# Patient Record
Sex: MALE | Race: WHITE | NOT HISPANIC OR LATINO | Employment: UNEMPLOYED | URBAN - NONMETROPOLITAN AREA
[De-identification: names, ages, dates, MRNs, and addresses within clinical notes are randomized per-mention and may not be internally consistent; named-entity substitution may affect disease eponyms.]

---

## 2024-10-21 ENCOUNTER — LAB (OUTPATIENT)
Dept: LAB | Facility: HOSPITAL | Age: 49
End: 2024-10-21
Payer: MEDICAID

## 2024-10-21 ENCOUNTER — OFFICE VISIT (OUTPATIENT)
Dept: PSYCHIATRY | Facility: CLINIC | Age: 49
End: 2024-10-21
Payer: MEDICAID

## 2024-10-21 VITALS
OXYGEN SATURATION: 97 % | WEIGHT: 163 LBS | DIASTOLIC BLOOD PRESSURE: 74 MMHG | HEIGHT: 70 IN | BODY MASS INDEX: 23.34 KG/M2 | HEART RATE: 86 BPM | SYSTOLIC BLOOD PRESSURE: 102 MMHG

## 2024-10-21 DIAGNOSIS — F43.10 POST TRAUMATIC STRESS DISORDER (PTSD): ICD-10-CM

## 2024-10-21 DIAGNOSIS — F11.20 OPIOID USE DISORDER, SEVERE, ON MAINTENANCE THERAPY: Primary | ICD-10-CM

## 2024-10-21 DIAGNOSIS — F33.1 MODERATE EPISODE OF RECURRENT MAJOR DEPRESSIVE DISORDER: ICD-10-CM

## 2024-10-21 DIAGNOSIS — F51.04 INSOMNIA, PSYCHOPHYSIOLOGICAL: ICD-10-CM

## 2024-10-21 DIAGNOSIS — F41.1 GENERALIZED ANXIETY DISORDER: ICD-10-CM

## 2024-10-21 DIAGNOSIS — F15.21 METHAMPHETAMINE USE DISORDER, SEVERE, IN EARLY REMISSION: ICD-10-CM

## 2024-10-21 DIAGNOSIS — F11.20 OPIOID USE DISORDER, SEVERE, ON MAINTENANCE THERAPY: ICD-10-CM

## 2024-10-21 LAB
AMPHET+METHAMPHET UR QL: NEGATIVE
AMPHETAMINES UR QL: NEGATIVE
BARBITURATES UR QL SCN: NEGATIVE
BENZODIAZ UR QL SCN: NEGATIVE
BUPRENORPHINE SERPL-MCNC: POSITIVE NG/ML
CANNABINOIDS SERPL QL: NEGATIVE
COCAINE UR QL: NEGATIVE
METHADONE UR QL SCN: NEGATIVE
OPIATES UR QL: NEGATIVE
OXYCODONE UR QL SCN: NEGATIVE
PCP UR QL SCN: NEGATIVE
TRICYCLICS UR QL SCN: NEGATIVE

## 2024-10-21 PROCEDURE — 80306 DRUG TEST PRSMV INSTRMNT: CPT

## 2024-10-21 PROCEDURE — 1160F RVW MEDS BY RX/DR IN RCRD: CPT | Performed by: NURSE PRACTITIONER

## 2024-10-21 PROCEDURE — 90792 PSYCH DIAG EVAL W/MED SRVCS: CPT | Performed by: NURSE PRACTITIONER

## 2024-10-21 PROCEDURE — 1159F MED LIST DOCD IN RCRD: CPT | Performed by: NURSE PRACTITIONER

## 2024-10-21 RX ORDER — TRAZODONE HYDROCHLORIDE 50 MG/1
50 TABLET, FILM COATED ORAL NIGHTLY PRN
Qty: 30 TABLET | Refills: 0 | Status: SHIPPED | OUTPATIENT
Start: 2024-10-21

## 2024-10-21 RX ORDER — BUPRENORPHINE HYDROCHLORIDE AND NALOXONE HYDROCHLORIDE DIHYDRATE 8; 2 MG/1; MG/1
2 TABLET SUBLINGUAL DAILY
Qty: 16 TABLET | Refills: 0 | Status: SHIPPED | OUTPATIENT
Start: 2024-10-21 | End: 2024-10-28 | Stop reason: SDUPTHER

## 2024-10-21 RX ORDER — BUPRENORPHINE HYDROCHLORIDE AND NALOXONE HYDROCHLORIDE DIHYDRATE 8; 2 MG/1; MG/1
1 TABLET SUBLINGUAL DAILY
COMMUNITY
End: 2024-10-21 | Stop reason: SDUPTHER
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
## 2024-10-21 NOTE — PATIENT INSTRUCTIONS
Major Depressive Disorder, Adult  Major depressive disorder (MDD) is a mental health condition. It may also be called clinical depression or unipolar depression. MDD causes symptoms of sadness, hopelessness, and loss of interest in things. These symptoms last most of the day, almost every day, for 2 weeks. MDD can also cause physical symptoms. It can interfere with relationships and activities, such as work, school, and activities that are usually pleasant.  MDD may be mild, moderate, or severe. It may be single-episode MDD, which happens once, or recurrent MDD, which may occur many times.  What are the causes?  The exact cause of this condition is not known.  What increases the risk?  The following factors may make someone more likely to develop MDD:  A family history of depression.  Being female.  Long-term (chronic) stress, physical illness, other mental health disorders, or substance misuse.  Trauma, including:  Family problems.  Violence or abuse.  Loss of a parent or close family member.  Experiencing discrimination.  What are the signs or symptoms?  The main symptoms of MDD usually include:  Constant depressed or irritable mood.  A loss of interest in activities.  Sleeping or eating too much or too little.  Tiredness or low energy.  Other symptoms include:  Unexplained weight gain or weight loss.  Being agitated, restless, or weak.  Feeling hopeless, worthless, or guilty.  Trouble thinking clearly or making decisions.  Thoughts of suicide or harming others.  Spending a lot of time alone.  Not being able to complete daily tasks or work.  Severe symptoms of this condition may include:  Psychotic depression.This may include false beliefs or delusions. It may also include seeing, hearing, tasting, smelling, or feeling things that are not real (hallucinations).  Chronic depression or persistent depressive disorder. This is low-level depression that lasts for at least 2 years.  Melancholic depression, or feeling  extremely sad and hopeless.  Catatonic depression, which includes trouble speaking and trouble moving.  Seasonal depression, which is caused by changes in the seasons.  How is this diagnosed?  This condition may be diagnosed based on:  Your symptoms.  Your medical and mental health history.  A physical exam.  Blood tests to rule out other conditions.  MDD is confirmed if you have either a depressed mood or loss of interest and at least four other MDD symptoms, most of the day, nearly every day, in a 2-week period.  How is this treated?  This condition is usually treated by mental health professionals, such as psychologists, psychiatrists, and clinical social workers. You may need more than one type of treatment. Treatment may include:  Psychotherapy, also called talk therapy or counseling. Types of psychotherapy include:  Cognitive behavioral therapy (CBT). This teaches you to recognize unhealthy feelings, thoughts, and behaviors, and replace them with positive thoughts and actions.  Interpersonal therapy (IPT). This helps you to improve the way you communicate with others or relate to them.  Family therapy. This treatment includes members of your family.  Medicines to treat anxiety and depression. These medicines help to balance the brain chemicals that affect your emotions.  Lifestyle changes. You may be asked to:  Limit alcohol use and avoid drug use.  Get regular exercise.  Get plenty of sleep.  Make healthy eating choices.  Spend more time outdoors.  Brain stimulation. This may be done if symptoms are very severe and other treatments have not worked. Examples of this treatment are electroconvulsive therapy and transcranial magnetic stimulation.  Follow these instructions at home:  Alcohol use  Do not drink alcohol if:  Your health care provider tells you not to drink.  You are pregnant, may be pregnant, or are planning to become pregnant.  If you drink alcohol:  Limit how much you have to:  0-1 drink a day for  women  0-2 drinks a day for men.  Know how much alcohol is in your drink. In the [place] one drink equals one 12 oz bottle of beer (355 mL), one 5 oz glass of wine (148 mL), or one 1½ oz glass of hard liquor (44 mL).  Activity  Exercise regularly and spend time outdoors.  Find activities that you enjoy and make time to do them.  Find healthy ways to manage stress, such as:  Meditation or deep breathing.  Spending time in nature.  Journaling.  Return to your normal activities as told by your health care provider. Ask your health care provider what activities are safe for you.  General instructions    Take over-the-counter and prescription medicines only as told by your health care provider.  Discuss alcohol use with your health care provider. Alcohol can affect any antidepressant medicines you are taking.  Discuss any drug use with your health care provider.  Eat a healthy diet and get enough sleep.  Consider joining a support group. Your health care provider may be able to recommend one.  Keep all follow-up visits. It is important for your health care provider to check on your mood, behavior, and medicines. Your health care provider will make changes to your treatment as needed.  Where to find more information  [place] [employer] [place]ness: marcella.org  [place] of Mental Health: nimh.nih.gov  American Psychiatric Association: psychiatry.org  Contact a health care provider if:  Your symptoms get worse.  You develop new symptoms.  Get help right away if:  You hurt yourself on purpose (self-harm).  You have thoughts about hurting yourself or others.  You have hallucinations.  Get help right away if you feel like you may hurt yourself or others, or have thoughts about taking your own life. Go to your nearest emergency room or:  Call 911.  Call the National Suicide Prevention Lifeline at 1-840.812.8899 or 562. This is open 24 hours a day.  Text the Crisis Text Line at 784280.  This information is not  intended to replace advice given to you by your health care provider. Make sure you discuss any questions you have with your health care provider.  Document Revised: 04/25/2023 Document Reviewed: 04/25/2023  Elsevier Patient Education © 2024 [employer]

## 2024-10-22 RX ORDER — VELPATASVIR AND SOFOSBUVIR 100; 400 MG/1; MG/1
1 TABLET, FILM COATED ORAL DAILY
COMMUNITY

## 2024-10-25 LAB — REF LAB TEST METHOD: NORMAL

## 2024-10-28 ENCOUNTER — LAB (OUTPATIENT)
Dept: LAB | Facility: HOSPITAL | Age: 49
End: 2024-10-28
Payer: MEDICAID

## 2024-10-28 ENCOUNTER — OFFICE VISIT (OUTPATIENT)
Dept: PSYCHIATRY | Facility: CLINIC | Age: 49
End: 2024-10-28
Payer: MEDICAID

## 2024-10-28 VITALS
WEIGHT: 171 LBS | HEIGHT: 70 IN | OXYGEN SATURATION: 96 % | SYSTOLIC BLOOD PRESSURE: 122 MMHG | HEART RATE: 84 BPM | DIASTOLIC BLOOD PRESSURE: 74 MMHG | BODY MASS INDEX: 24.48 KG/M2

## 2024-10-28 DIAGNOSIS — F43.10 POST TRAUMATIC STRESS DISORDER (PTSD): ICD-10-CM

## 2024-10-28 DIAGNOSIS — F51.04 INSOMNIA, PSYCHOPHYSIOLOGICAL: Primary | ICD-10-CM

## 2024-10-28 DIAGNOSIS — F11.20 OPIOID USE DISORDER, SEVERE, ON MAINTENANCE THERAPY: ICD-10-CM

## 2024-10-28 DIAGNOSIS — F33.1 MODERATE EPISODE OF RECURRENT MAJOR DEPRESSIVE DISORDER: ICD-10-CM

## 2024-10-28 DIAGNOSIS — F51.04 INSOMNIA, PSYCHOPHYSIOLOGICAL: ICD-10-CM

## 2024-10-28 DIAGNOSIS — F15.21 METHAMPHETAMINE USE DISORDER, SEVERE, IN EARLY REMISSION: ICD-10-CM

## 2024-10-28 DIAGNOSIS — F41.1 GENERALIZED ANXIETY DISORDER: ICD-10-CM

## 2024-10-28 PROCEDURE — 1160F RVW MEDS BY RX/DR IN RCRD: CPT | Performed by: NURSE PRACTITIONER

## 2024-10-28 PROCEDURE — 1159F MED LIST DOCD IN RCRD: CPT | Performed by: NURSE PRACTITIONER

## 2024-10-28 PROCEDURE — 80306 DRUG TEST PRSMV INSTRMNT: CPT

## 2024-10-28 PROCEDURE — 99214 OFFICE O/P EST MOD 30 MIN: CPT | Performed by: NURSE PRACTITIONER

## 2024-10-28 PROCEDURE — 96127 BRIEF EMOTIONAL/BEHAV ASSMT: CPT | Performed by: NURSE PRACTITIONER

## 2024-10-28 RX ORDER — BUPRENORPHINE HYDROCHLORIDE AND NALOXONE HYDROCHLORIDE DIHYDRATE 8; 2 MG/1; MG/1
2 TABLET SUBLINGUAL DAILY
Qty: 14 TABLET | Refills: 0 | Status: SHIPPED | OUTPATIENT
Start: 2024-10-28 | End: 2024-11-04 | Stop reason: SDUPTHER

## 2024-10-28 NOTE — PROGRESS NOTES
Office  Follow Up Visit      Patient Name: Simon Pate  : 1975   MRN: [id number]     Referring Provider: Provider, No Known    Chief Complaint: Substance use    History of Present Illness:   Simon Pate is a 48 y.o. male who is here today for follow up related to substance use. Taking buprenorphine/naloxone 16-4 mg daily and is tolerating well with no adverse effects. Denies any recurrence of illicit substance or alcohol use, cravings, or triggering events since last follow up. Attending recovery meetings or participating in recovery related content 5 a week and IOP through sober living.  Trazodone 50 mg nightly as needed for sleep started at last visit due to complaint of trouble falling asleep and staying asleep.  Medication has not been helpful.  Still waking up about every 2 hours.  Does not wake feeling rested.  Utilizing good sleep hygiene. LFTs are current as of 2024.  Will anticipate follow-up labs with hep C treatment.     Triggers: Anxiety    Cravings: Every other day for methamphetamine    Relapse Prevention: MOUD, sober living programming    Urine Drug Screen (today's visit) discussed: Will go after visit today    UDS Confirmation: 10/21/2024 positive buprenorphine, norbuprenorphine    CHARISSE (PDMP) Reviewed for Current/Active Medications: buprenorphine/naloxone as expected 10/14/24      Past Surgical History:  Past Surgical History:   Procedure Laterality Date    CYST REMOVAL      MULTIPLE TOOTH EXTRACTIONS         Problem List:  There is no problem list on file for this patient.      Allergy:   Allergies   Allergen Reactions    Ibuprofen Hives and Itching        Current Medications:   Current Outpatient Medications   Medication Sig Dispense Refill    buprenorphine-naloxone (SUBOXONE) 8-2 MG per SL tablet Place 2 tablets under the tongue Daily for 7 days. 14 tablet 0    Sofosbuvir-Velpatasvir (Epclusa) 400-100 MG tablet Take 1 tablet by mouth Daily.      traZODone (DESYREL) 50  MG tablet Take 1 tablet by mouth At Night As Needed for Sleep. 30 tablet 0     No current facility-administered medications for this visit.       Past Medical History:  Past Medical History:   Diagnosis Date    Substance abuse        Social History:  Social History     Socioeconomic History    Marital status: Single   Tobacco Use    Smoking status: Every Day     Current packs/day: 2.00     Average packs/day: 2.0 packs/day for 20.8 years (41.6 ttl pk-yrs)     Types: Cigarettes     Start date: 2004     Passive exposure: Current    Smokeless tobacco: Never   Vaping Use    Vaping status: Every Day    Substances: Nicotine, Flavoring    Devices: Pre-filled or refillable cartridge, Refillable tank    Passive vaping exposure: Yes   Substance and Sexual Activity    Alcohol use: Not Currently    Drug use: Not Currently     Types: Methamphetamines     Comment: sober 5 months    Sexual activity: Defer       Family History:  History reviewed. No pertinent family history.      Subjective      Review of Systems:   Review of Systems   Constitutional:  Positive for fatigue. Negative for chills and fever.   Respiratory:  Negative for shortness of breath.    Cardiovascular:  Negative for chest pain.   Gastrointestinal:  Negative for abdominal pain.   Skin:  Negative for skin lesions.   Neurological:  Negative for seizures and confusion.   Psychiatric/Behavioral:  Positive for decreased concentration, sleep disturbance, depressed mood and stress. Negative for hallucinations and suicidal ideas. The patient is nervous/anxious.        PHQ-9 Depression Screening  Little interest or pleasure in doing things? Several days   Feeling down, depressed, or hopeless? Several days   PHQ-2 Total Score 2   Trouble falling or staying asleep, or sleeping too much? Over half   Feeling tired or having little energy? Several days   Poor appetite or overeating? Not at all   Feeling bad about yourself - or that you are a failure or have let yourself or your  family down? Several days   Trouble concentrating on things, such as reading the newspaper or watching television? Almost all   Moving or speaking so slowly that other people could have noticed? Or the opposite - being so fidgety or restless that you have been moving around a lot more than usual? Over half   Thoughts that you would be better off dead, or of hurting yourself in some way? Not at all   PHQ-9 Total Score 11   If you checked off any problems, how difficult have these problems made it for you to do your work, take care of things at home, or get along with other people? Somewhat difficult     VIRGILIO-7 Score:   Feeling nervous, anxious or on edge: Nearly every day  Not being able to stop or control worrying: More than half the days  Worrying too much about different things: More than half the days  Trouble Relaxing: More than half the days  Being so restless that it is hard to sit still: Nearly every day  Feeling afraid as if something awful might happen: Several days  Becoming easily annoyed or irritable: More than half the days  VIRGILOI 7 Total Score: 15  If you checked any problems, how difficult have these problems made it for you to do your work, take care of things at home, or get along with other people: Somewhat difficult    Patient History:   The following portions of the patient's history were reviewed and updated as appropriate: allergies, current medications, past family history, past medical history, past social history, past surgical history and problem list.     Social:  Social History     Socioeconomic History    Marital status: Single   Tobacco Use    Smoking status: Every Day     Current packs/day: 2.00     Average packs/day: 2.0 packs/day for 20.8 years (41.6 ttl pk-yrs)     Types: Cigarettes     Start date: 2004     Passive exposure: Current    Smokeless tobacco: Never   Vaping Use    Vaping status: Every Day    Substances: Nicotine, Flavoring    Devices: Pre-filled or refillable cartridge,  "Refillable tank    Passive vaping exposure: Yes   Substance and Sexual Activity    Alcohol use: Not Currently    Drug use: Not Currently     Types: Methamphetamines     Comment: sober 5 months    Sexual activity: Defer       Medications:     Current Outpatient Medications:     buprenorphine-naloxone (SUBOXONE) 8-2 MG per SL tablet, Place 2 tablets under the tongue Daily for 7 days., Disp: 14 tablet, Rfl: 0    Sofosbuvir-Velpatasvir (Epclusa) 400-100 MG tablet, Take 1 tablet by mouth Daily., Disp: , Rfl:     traZODone (DESYREL) 50 MG tablet, Take 1 tablet by mouth At Night As Needed for Sleep., Disp: 30 tablet, Rfl: 0    Objective     Physical Exam  Vitals reviewed.   Constitutional:       General: He is not in acute distress.     Appearance: He is well-developed. He is not ill-appearing.   Pulmonary:      Effort: No respiratory distress.   Neurological:      Mental Status: He is alert and oriented to person, place, and time.      Gait: Gait normal.   Psychiatric:         Attention and Perception: Attention normal.         Mood and Affect: Mood and affect normal.         Speech: Speech normal.         Behavior: Behavior normal. Behavior is cooperative.         Thought Content: Thought content is not paranoid or delusional. Thought content does not include homicidal or suicidal ideation. Thought content does not include homicidal or suicidal plan.         Cognition and Memory: Cognition and memory normal.         Vital Signs:   Vitals:    10/28/24 1534   BP: 122/74   Pulse: 84   SpO2: 96%   Weight: 77.6 kg (171 lb)   Height: 177.8 cm (70\")     Body mass index is 24.54 kg/m².     Mental Status Exam:   Hygiene:   good  Cooperation:  Cooperative  Eye Contact:  Good  Psychomotor Behavior:  Appropriate  Affect:  Full range  Mood: normal  Speech:  Normal  Thought Process:  Goal directed  Thought Content:  Normal  Suicidal:  None  Homicidal:  None  Hallucinations:  None  Delusion:  None  Memory:  Intact  Orientation:  " Person, Place, Time, and Situation  Reliability:  good  Insight:  Good  Judgement:  Fair  Impulse Control:  Fair    Assessment / Plan      -Continue buprenorphine/naloxone 16-4 mg daily for opioid use disorder  -Will have RTC dose at next visit +0 remaining  -Advisement to take part in and remain active in 12 Step Recovery Meetings, IOP, and/or 1:1 therapy/counseling and to establish/maintain an active relationship with a recovery sponsor.   -Agreeable to continued monitoring and will request confirmations with levels on all preliminary positive results for patient safety, medication compliance, adherence to treatment plan, toxicity monitoring (when applicable), and planning of future care.   -Continue to follow 15/15/15 minute administration rule  -Safe medication storage encouraged  -Does not need Narcan refill today    -Increase trazodone to 100mg nightly as needed for sleep from home supply.  Hopeful it will help with depression as well.  Declines individual therapy.  Utilizing good sleep hygiene.  Discussed adverse effects of medication and when to call/RTC.  -Has been referred to hep C clinic for continuation of medication.  Will reach out to clinic if he has not heard from them by next appointment.    Assessment & Plan   Problems Addressed this Visit    None  Visit Diagnoses       Insomnia, psychophysiological    -  Primary    Opioid use disorder, severe, on maintenance therapy        Relevant Medications    buprenorphine-naloxone (SUBOXONE) 8-2 MG per SL tablet    Moderate episode of recurrent major depressive disorder              Diagnoses         Codes Comments    Insomnia, psychophysiological    -  Primary ICD-10-CM: F51.04  ICD-9-CM: 307.42     Opioid use disorder, severe, on maintenance therapy     ICD-10-CM: F11.20  ICD-9-CM: 304.00     Moderate episode of recurrent major depressive disorder     ICD-10-CM: F33.1  ICD-9-CM: 296.32               PLAN:  Safety: No acute safety concerns  Risk Assessment:  Risk of self-harm acutely is low. Risk of self-harm chronically is also low, but could be further elevated in the event of treatment noncompliance and/or AODA.      TREATMENT PLAN/GOALS: Continue supportive psychotherapy efforts and medications as indicated. Treatment and medication options discussed during today's visit. Patient acknowledged and verbally consented to continue with current treatment plan and was educated on the importance of compliance with treatment and follow-up appointments.      MEDICATION ISSUES:  CHARISSE reviewed as expected.  Discussed medication options and treatment plan of prescribed medication as well as the risks, benefits, and side effects including potential falls, possible impaired driving and metabolic adversities among others. Patient is agreeable to call the office with any worsening of symptoms or onset of side effects. Patient is agreeable to call 911 or go to the nearest ER should he/she begin having SI/HI. No medication side effects or related complaints today.     MEDS ORDERED DURING VISIT:  New Medications Ordered This Visit   Medications    buprenorphine-naloxone (SUBOXONE) 8-2 MG per SL tablet     Sig: Place 2 tablets under the tongue Daily for 7 days.     Dispense:  14 tablet     Refill:  0     NADEAN:[id number]       Return in about 1 week (around 11/4/2024) for Follow Up Medication.             This document has been electronically signed by KRYSTYNA Strong  October 28, 2024 15:48 EDT      Part of this note may be an electronic transcription/translation of spoken language to printed text using the Dragon Dictation System.

## 2024-10-31 LAB — REF LAB TEST METHOD: NORMAL

## 2024-11-04 ENCOUNTER — LAB (OUTPATIENT)
Dept: LAB | Facility: HOSPITAL | Age: 49
End: 2024-11-04
Payer: MEDICAID

## 2024-11-04 ENCOUNTER — OFFICE VISIT (OUTPATIENT)
Dept: PSYCHIATRY | Facility: CLINIC | Age: 49
End: 2024-11-04
Payer: MEDICAID

## 2024-11-04 VITALS
WEIGHT: 172.8 LBS | DIASTOLIC BLOOD PRESSURE: 74 MMHG | OXYGEN SATURATION: 98 % | HEIGHT: 70 IN | SYSTOLIC BLOOD PRESSURE: 128 MMHG | BODY MASS INDEX: 24.74 KG/M2 | HEART RATE: 76 BPM

## 2024-11-04 DIAGNOSIS — F51.04 INSOMNIA, PSYCHOPHYSIOLOGICAL: ICD-10-CM

## 2024-11-04 DIAGNOSIS — F11.20 OPIOID USE DISORDER, SEVERE, ON MAINTENANCE THERAPY: ICD-10-CM

## 2024-11-04 DIAGNOSIS — F41.1 GENERALIZED ANXIETY DISORDER: ICD-10-CM

## 2024-11-04 DIAGNOSIS — F43.10 POST TRAUMATIC STRESS DISORDER (PTSD): ICD-10-CM

## 2024-11-04 DIAGNOSIS — F33.1 MODERATE EPISODE OF RECURRENT MAJOR DEPRESSIVE DISORDER: ICD-10-CM

## 2024-11-04 DIAGNOSIS — F15.21 METHAMPHETAMINE USE DISORDER, SEVERE, IN EARLY REMISSION: ICD-10-CM

## 2024-11-04 PROCEDURE — 1159F MED LIST DOCD IN RCRD: CPT | Performed by: NURSE PRACTITIONER

## 2024-11-04 PROCEDURE — 80306 DRUG TEST PRSMV INSTRMNT: CPT

## 2024-11-04 PROCEDURE — 1160F RVW MEDS BY RX/DR IN RCRD: CPT | Performed by: NURSE PRACTITIONER

## 2024-11-04 PROCEDURE — 96127 BRIEF EMOTIONAL/BEHAV ASSMT: CPT | Performed by: NURSE PRACTITIONER

## 2024-11-04 PROCEDURE — 99213 OFFICE O/P EST LOW 20 MIN: CPT | Performed by: NURSE PRACTITIONER

## 2024-11-04 RX ORDER — BUPRENORPHINE HYDROCHLORIDE AND NALOXONE HYDROCHLORIDE DIHYDRATE 8; 2 MG/1; MG/1
2 TABLET SUBLINGUAL DAILY
Qty: 14 TABLET | Refills: 0 | Status: SHIPPED | OUTPATIENT
Start: 2024-11-04 | End: 2024-11-11

## 2024-11-04 RX ORDER — TRAZODONE HYDROCHLORIDE 100 MG/1
100 TABLET ORAL NIGHTLY PRN
Qty: 15 TABLET | Refills: 0 | Status: SHIPPED | OUTPATIENT
Start: 2024-11-04 | End: 2024-11-19

## 2024-11-04 NOTE — PROGRESS NOTES
Office  Follow Up Visit      Patient Name: Simon Pate  : 1975   MRN: [id number]     Referring Provider: Provider, No Known    Chief Complaint: Substance use    History of Present Illness:   Simon Pate is a 48 y.o. male who is here today for follow up related to substance use. Taking buprenorphine/naloxone 16-4 mg daily and is tolerating well with no adverse effects. Denies any recurrence of illicit substance or alcohol use, cravings, or triggering events since last follow up. Attending recovery meetings or participating in recovery related content 5 a week and IOP through sober living.  Trazodone increased to 100 mg nightly as needed for sleep started at last visit. Was not able to start as facility needs new rx with instructions.  Still waking up about every 2 hours.  Does not wake feeling rested.  Utilizing good sleep hygiene. LFTs are current as of 2024.  Still needs to get into hep c clinic.      Triggers: Anxiety    Cravings: Denies    Relapse Prevention: MOUD, sober living programming    Urine Drug Screen (today's visit) discussed: Will go after visit today    UDS Confirmation: 10/28/2024 positive buprenorphine, norbuprenorphine (levels stable)    CHARISSE (PDMP) Reviewed for Current/Active Medications: PDMP down    Past Surgical History:  Past Surgical History:   Procedure Laterality Date    CYST REMOVAL      MULTIPLE TOOTH EXTRACTIONS         Problem List:  There is no problem list on file for this patient.      Allergy:   Allergies   Allergen Reactions    Ibuprofen Hives and Itching        Current Medications:   Current Outpatient Medications   Medication Sig Dispense Refill    buprenorphine-naloxone (SUBOXONE) 8-2 MG per SL tablet Place 2 tablets under the tongue Daily for 7 days. 14 tablet 0    Sofosbuvir-Velpatasvir (Epclusa) 400-100 MG tablet Take 1 tablet by mouth Daily.      traZODone (DESYREL) 100 MG tablet Take 1 tablet by mouth At Night As Needed for Sleep for up to 15  days. 15 tablet 0     No current facility-administered medications for this visit.       Past Medical History:  Past Medical History:   Diagnosis Date    Substance abuse        Social History:  Social History     Socioeconomic History    Marital status: Single   Tobacco Use    Smoking status: Every Day     Current packs/day: 2.00     Average packs/day: 2.0 packs/day for 20.8 years (41.7 ttl pk-yrs)     Types: Cigarettes     Start date: 2004     Passive exposure: Current    Smokeless tobacco: Never   Vaping Use    Vaping status: Every Day    Substances: Nicotine, Flavoring    Devices: Pre-filled or refillable cartridge, Refillable tank    Passive vaping exposure: Yes   Substance and Sexual Activity    Alcohol use: Not Currently    Drug use: Not Currently     Types: Methamphetamines     Comment: sober 5 months    Sexual activity: Defer       Family History:  History reviewed. No pertinent family history.      Subjective      Review of Systems:   Review of Systems   Constitutional:  Positive for fatigue. Negative for chills and fever.   Respiratory:  Negative for shortness of breath.    Cardiovascular:  Negative for chest pain.   Gastrointestinal:  Negative for abdominal pain.   Skin:  Negative for skin lesions.   Neurological:  Negative for seizures and confusion.   Psychiatric/Behavioral:  Positive for decreased concentration, sleep disturbance and stress. Negative for hallucinations, suicidal ideas and depressed mood. The patient is nervous/anxious.        PHQ-9 Depression Screening  Little interest or pleasure in doing things? Over half   Feeling down, depressed, or hopeless? Several days   PHQ-2 Total Score 3   Trouble falling or staying asleep, or sleeping too much? Over half   Feeling tired or having little energy? Several days   Poor appetite or overeating? Not at all   Feeling bad about yourself - or that you are a failure or have let yourself or your family down? Several days   Trouble concentrating on  things, such as reading the newspaper or watching television? Almost all   Moving or speaking so slowly that other people could have noticed? Or the opposite - being so fidgety or restless that you have been moving around a lot more than usual? Over half   Thoughts that you would be better off dead, or of hurting yourself in some way? Not at all   PHQ-9 Total Score 12   If you checked off any problems, how difficult have these problems made it for you to do your work, take care of things at home, or get along with other people? Somewhat difficult     VIRGILIO-7 Score:   Feeling nervous, anxious or on edge: Nearly every day  Not being able to stop or control worrying: Several days  Worrying too much about different things: Several days  Trouble Relaxing: More than half the days  Being so restless that it is hard to sit still: More than half the days  Feeling afraid as if something awful might happen: Not at all  VIRGILIO 7 Total Score: 9  If you checked any problems, how difficult have these problems made it for you to do your work, take care of things at home, or get along with other people: Not difficult at all    Patient History:   The following portions of the patient's history were reviewed and updated as appropriate: allergies, current medications, past family history, past medical history, past social history, past surgical history and problem list.     Social:  Social History     Socioeconomic History    Marital status: Single   Tobacco Use    Smoking status: Every Day     Current packs/day: 2.00     Average packs/day: 2.0 packs/day for 20.8 years (41.7 ttl pk-yrs)     Types: Cigarettes     Start date: 2004     Passive exposure: Current    Smokeless tobacco: Never   Vaping Use    Vaping status: Every Day    Substances: Nicotine, Flavoring    Devices: Pre-filled or refillable cartridge, Refillable tank    Passive vaping exposure: Yes   Substance and Sexual Activity    Alcohol use: Not Currently    Drug use: Not  "Currently     Types: Methamphetamines     Comment: sober 5 months    Sexual activity: Defer       Medications:     Current Outpatient Medications:     buprenorphine-naloxone (SUBOXONE) 8-2 MG per SL tablet, Place 2 tablets under the tongue Daily for 7 days., Disp: 14 tablet, Rfl: 0    Sofosbuvir-Velpatasvir (Epclusa) 400-100 MG tablet, Take 1 tablet by mouth Daily., Disp: , Rfl:     traZODone (DESYREL) 100 MG tablet, Take 1 tablet by mouth At Night As Needed for Sleep for up to 15 days., Disp: 15 tablet, Rfl: 0    Objective     Physical Exam  Vitals reviewed.   Constitutional:       General: He is not in acute distress.     Appearance: He is well-developed. He is not ill-appearing.   Pulmonary:      Effort: No respiratory distress.   Neurological:      Mental Status: He is alert and oriented to person, place, and time.      Gait: Gait normal.   Psychiatric:         Attention and Perception: Attention normal.         Mood and Affect: Mood and affect normal.         Speech: Speech normal.         Behavior: Behavior normal. Behavior is cooperative.         Thought Content: Thought content is not paranoid or delusional. Thought content does not include homicidal or suicidal ideation. Thought content does not include homicidal or suicidal plan.         Cognition and Memory: Cognition and memory normal.         Vital Signs:   Vitals:    11/04/24 1519   BP: 128/74   Pulse: 76   SpO2: 98%   Weight: 78.4 kg (172 lb 12.8 oz)   Height: 177.8 cm (70\")     Body mass index is 24.79 kg/m².     Mental Status Exam: Reviewed 11/4/2024  Hygiene:   good  Cooperation:  Cooperative  Eye Contact:  Good  Psychomotor Behavior:  Appropriate  Affect:  Full range  Mood: normal  Speech:  Normal  Thought Process:  Goal directed  Thought Content:  Normal  Suicidal:  None  Homicidal:  None  Hallucinations:  None  Delusion:  None  Memory:  Intact  Orientation:  Person, Place, Time, and Situation  Reliability:  good  Insight:  Good  Judgement:  " Fair  Impulse Control:  Fair    Assessment / Plan      -Continue buprenorphine/naloxone 16-4 mg daily for opioid use disorder  -Will have RTC dose at next visit +0 remaining  -Advisement to take part in and remain active in 12 Step Recovery Meetings, IOP, and/or 1:1 therapy/counseling and to establish/maintain an active relationship with a recovery sponsor.   -Agreeable to continued monitoring and will request confirmations with levels on all preliminary positive results for patient safety, medication compliance, adherence to treatment plan, toxicity monitoring (when applicable), and planning of future care.   -Continue to follow 15/15/15 minute administration rule  -Safe medication storage encouraged  -Does not need Narcan refill today    -Increase trazodone to 100mg nightly as needed for sleep from home supply.  Hopeful it will help with depression as well.  Declines individual therapy.  Utilizing good sleep hygiene.  Discussed adverse effects of medication and when to call/RTC. Medication sent today.  -Has been referred to hep C clinic for continuation of medication. Will have clinic reach out again today.     Assessment & Plan   Problems Addressed this Visit    None  Visit Diagnoses       Opioid use disorder, severe, on maintenance therapy        Relevant Medications    buprenorphine-naloxone (SUBOXONE) 8-2 MG per SL tablet    Insomnia, psychophysiological        Relevant Medications    traZODone (DESYREL) 100 MG tablet          Diagnoses         Codes Comments    Opioid use disorder, severe, on maintenance therapy     ICD-10-CM: F11.20  ICD-9-CM: 304.00     Insomnia, psychophysiological     ICD-10-CM: F51.04  ICD-9-CM: 307.42               PLAN:  Safety: No acute safety concerns  Risk Assessment: Risk of self-harm acutely is low. Risk of self-harm chronically is also low, but could be further elevated in the event of treatment noncompliance and/or AODA.      TREATMENT PLAN/GOALS: Continue supportive  psychotherapy efforts and medications as indicated. Treatment and medication options discussed during today's visit. Patient acknowledged and verbally consented to continue with current treatment plan and was educated on the importance of compliance with treatment and follow-up appointments.      MEDICATION ISSUES:  CHARISSE reviewed as expected.  Discussed medication options and treatment plan of prescribed medication as well as the risks, benefits, and side effects including potential falls, possible impaired driving and metabolic adversities among others. Patient is agreeable to call the office with any worsening of symptoms or onset of side effects. Patient is agreeable to call 911 or go to the nearest ER should he/she begin having SI/HI. No medication side effects or related complaints today.     MEDS ORDERED DURING VISIT:  New Medications Ordered This Visit   Medications    buprenorphine-naloxone (SUBOXONE) 8-2 MG per SL tablet     Sig: Place 2 tablets under the tongue Daily for 7 days.     Dispense:  14 tablet     Refill:  0     NADEAN:[id number]    traZODone (DESYREL) 100 MG tablet     Sig: Take 1 tablet by mouth At Night As Needed for Sleep for up to 15 days.     Dispense:  15 tablet     Refill:  0       Return in about 1 week (around 11/11/2024) for Follow Up Medication.             This document has been electronically signed by KRYSTYNA Strong  November 4, 2024 15:38 EST      Part of this note may be an electronic transcription/translation of spoken language to printed text using the Dragon Dictation System.

## 2024-11-06 LAB — REF LAB TEST METHOD: NORMAL

## 2024-11-12 DIAGNOSIS — F11.20 OPIOID USE DISORDER, SEVERE, ON MAINTENANCE THERAPY: ICD-10-CM

## 2024-11-12 RX ORDER — BUPRENORPHINE HYDROCHLORIDE AND NALOXONE HYDROCHLORIDE DIHYDRATE 8; 2 MG/1; MG/1
2 TABLET SUBLINGUAL DAILY
Qty: 4 TABLET | Refills: 0 | Status: SHIPPED | OUTPATIENT
Start: 2024-11-12 | End: 2024-11-13 | Stop reason: SDUPTHER

## 2024-11-12 NOTE — TELEPHONE ENCOUNTER
Medication sent x2 days to allow him time to get appt scheduled.      [Alert] : alert [Normal Voice/Communication] : normal voice/communication [Healthy Appearing] : healthy appearing [No Acute Distress] : no acute distress [Sclera] : the sclera and conjunctiva were normal [Hearing Threshold Finger Rub Not [place]] : hearing was normal [Normal Lips/Gums] : the lips and gums were normal [Oropharynx] : the oropharynx was normal [Normal Appearance] : the appearance of the neck was normal [No Neck Mass] : no neck mass was observed [No Respiratory Distress] : no respiratory distress [No Acc Muscle Use] : no accessory muscle use [Respiration, Rhythm And Depth] : normal respiratory rhythm and effort [Auscultation Breath Sounds / Voice Sounds] : lungs were clear to auscultation bilaterally [Heart Rate And Rhythm] : heart rate was normal and rhythm regular [Normal S1, S2] : normal S1 and S2 [Murmurs] : no murmurs [Bowel Sounds] : normal bowel sounds [Abdomen Tenderness] : non-tender [No Masses] : no abdominal mass palpated [Abdomen Soft] : soft [] : no hepatosplenomegaly [Oriented To Time, Place, And Person] : oriented to person, place, and time

## 2024-11-13 RX ORDER — BUPRENORPHINE HYDROCHLORIDE AND NALOXONE HYDROCHLORIDE DIHYDRATE 8; 2 MG/1; MG/1
2 TABLET SUBLINGUAL DAILY
Qty: 12 TABLET | Refills: 0 | Status: SHIPPED | OUTPATIENT
Start: 2024-11-13

## 2024-11-13 NOTE — TELEPHONE ENCOUNTER
Simon called back, and has an appointment scheduled for 11/19/24. He was supposed to come the other day to complete his UDS, but was unable to get a ride. Simon said he will try to get here today to complete the UDS, but he was worried about running out of medication. Advised that we can send a bridge dose if [place] was ok with that.

## 2024-11-13 NOTE — TELEPHONE ENCOUNTER
Two days of medication sent in yesterday. Additional 6 days sent today. This should get him to his next appt. Can do UDS at next visit.

## 2024-11-20 ENCOUNTER — TELEMEDICINE (OUTPATIENT)
Dept: PSYCHIATRY | Facility: CLINIC | Age: 49
End: 2024-11-20
Payer: MEDICAID

## 2024-11-20 DIAGNOSIS — F11.20 OPIOID USE DISORDER, SEVERE, ON MAINTENANCE THERAPY: ICD-10-CM

## 2024-11-20 DIAGNOSIS — F51.04 INSOMNIA, PSYCHOPHYSIOLOGICAL: ICD-10-CM

## 2024-11-20 RX ORDER — BUPRENORPHINE HYDROCHLORIDE AND NALOXONE HYDROCHLORIDE DIHYDRATE 8; 2 MG/1; MG/1
2 TABLET SUBLINGUAL DAILY
Qty: 28 TABLET | Refills: 0 | Status: SHIPPED | OUTPATIENT
Start: 2024-11-20

## 2024-11-20 RX ORDER — TRAZODONE HYDROCHLORIDE 100 MG/1
100 TABLET ORAL NIGHTLY PRN
Qty: 30 TABLET | Refills: 0 | Status: SHIPPED | OUTPATIENT
Start: 2024-11-20

## 2024-11-20 NOTE — PROGRESS NOTES
Telehealth Visit      This provider is located at [facility], [address] [facility] in [place], [place],using a secure [employer] Video Visit through [employer]. Patient is being seen remotely via telehealth at their home address in [place], and stated they are in a secure environment for this session. The patient's condition being diagnosed/treated is appropriate for telemedicine. The provider identified herself as well as her credentials. The patient, and/or patients guardian, consent to be seen remotely, and when consent is given they understand that the consent allows for patient identifiable information to be sent to a third party as needed. They may refuse to be seen remotely at any time. The electronic data is encrypted and password protected, and the patient and/or guardian has been advised of the potential risks to privacy not withstanding such measures.    Patient Name: Simon Pate  : 1975   MRN: [id number]     Referring Provider: Provider, No Known    Chief Complaint: Follow up on substance use    History of Present Illness:   Simon Ptae is a 48 y.o. male who is here today for follow up related to MOUD. Taking buprenorphine/naloxone 16-4mg daily and is tolerating well with no adverse effects. Denies any recurrence of illicit substance or alcohol use, cravings, or triggering events since last follow up. Attending recovery meetings or participating in recovery related content 5 a week. LFTs are current as of 2024. Trazodone 100mg nightly has been very helpful. Sleeping well. No AE. Needs to get 2nd round of Hep C meds. Will call previous prescriber.     Triggers: Anxiety    Cravings: Denies    Relapse Prevention: MOUD, sober living programming    Urine Drug Screen (today's visit) discussed: Has UDS done today at facility.  Will have them faxed to our office.    UDS Confirmation: 2024 positive buprenorphine, norbuprenorphine    CHARISSE  (PDMP) Reviewed for Current/Active Medications: Buprenorphine/naloxone as expected    Past Surgical History:  Past Surgical History:   Procedure Laterality Date    CYST REMOVAL      MULTIPLE TOOTH EXTRACTIONS         Problem List:  There is no problem list on file for this patient.      Allergy:   Allergies   Allergen Reactions    Ibuprofen Hives and Itching        Current Medications:   Current Outpatient Medications   Medication Sig Dispense Refill    buprenorphine-naloxone (SUBOXONE) 8-2 MG per SL tablet Place 2 tablets under the tongue Daily. 28 tablet 0    traZODone (DESYREL) 100 MG tablet Take 1 tablet by mouth At Night As Needed for Sleep. 30 tablet 0    Sofosbuvir-Velpatasvir (Epclusa) 400-100 MG tablet Take 1 tablet by mouth Daily. (Patient not taking: Reported on 11/20/2024)       No current facility-administered medications for this visit.       Past Medical History:  Past Medical History:   Diagnosis Date    Substance abuse        Social History:  Social History     Socioeconomic History    Marital status: Single   Tobacco Use    Smoking status: Every Day     Current packs/day: 2.00     Average packs/day: 2.0 packs/day for 20.9 years (41.8 ttl pk-yrs)     Types: Cigarettes     Start date: 2004     Passive exposure: Current    Smokeless tobacco: Never   Vaping Use    Vaping status: Every Day    Substances: Nicotine, Flavoring    Devices: Pre-filled or refillable cartridge, Refillable tank    Passive vaping exposure: Yes   Substance and Sexual Activity    Alcohol use: Not Currently    Drug use: Not Currently     Types: Methamphetamines     Comment: sober 5 months    Sexual activity: Defer       Family History:  History reviewed. No pertinent family history.      Subjective      Review of Systems:   Review of Systems   Constitutional:  Negative for chills, fatigue and fever.   Respiratory:  Negative for shortness of breath.    Cardiovascular:  Negative for chest pain.   Gastrointestinal:  Negative for  abdominal pain.   Skin:  Negative for skin lesions.   Neurological:  Negative for seizures and confusion.   Psychiatric/Behavioral:  Positive for stress. Negative for hallucinations, sleep disturbance, suicidal ideas and depressed mood. The patient is not nervous/anxious.        PHQ-9 Depression Screening  Little interest or pleasure in doing things? (Patient-Rptd) Over half   Feeling down, depressed, or hopeless? (Patient-Rptd) Several days   PHQ-2 Total Score (Patient-Rptd) 3   Trouble falling or staying asleep, or sleeping too much? (Patient-Rptd) Not at all   Feeling tired or having little energy? (Patient-Rptd) Not at all   Poor appetite or overeating? (Patient-Rptd) Not at all   Feeling bad about yourself - or that you are a failure or have let yourself or your family down? (Patient-Rptd) Not at all   Trouble concentrating on things, such as reading the newspaper or watching television? (Patient-Rptd) Not at all   Moving or speaking so slowly that other people could have noticed? Or the opposite - being so fidgety or restless that you have been moving around a lot more than usual? (Patient-Rptd) Not at all   Thoughts that you would be better off dead, or of hurting yourself in some way? (Patient-Rptd) Not at all   PHQ-9 Total Score (Patient-Rptd) 3   If you checked off any problems, how difficult have these problems made it for you to do your work, take care of things at home, or get along with other people? (Patient-Rptd) Somewhat difficult         VIRGILIO-7 Score:   Feeling nervous, anxious or on edge: (Patient-Rptd) Several days  Not being able to stop or control worrying: (Patient-Rptd) Not at all  Worrying too much about different things: (Patient-Rptd) Not at all  Trouble Relaxing: (Patient-Rptd) Several days  Being so restless that it is hard to sit still: (Patient-Rptd) Several days  Feeling afraid as if something awful might happen: (Patient-Rptd) Not at all  Becoming easily annoyed or irritable:  (Patient-Rptd) Not at all  VIRGILIO 7 Total Score: (Patient-Rptd) 3  If you checked any problems, how difficult have these problems made it for you to do your work, take care of things at home, or get along with other people: (Patient-Rptd) Somewhat difficult    Patient History:   The following portions of the patient's history were reviewed and updated as appropriate: allergies, current medications, past family history, past medical history, past social history, past surgical history and problem list.     Social:  Social History     Socioeconomic History    Marital status: Single   Tobacco Use    Smoking status: Every Day     Current packs/day: 2.00     Average packs/day: 2.0 packs/day for 20.9 years (41.8 ttl pk-yrs)     Types: Cigarettes     Start date: 2004     Passive exposure: Current    Smokeless tobacco: Never   Vaping Use    Vaping status: Every Day    Substances: Nicotine, Flavoring    Devices: Pre-filled or refillable cartridge, Refillable tank    Passive vaping exposure: Yes   Substance and Sexual Activity    Alcohol use: Not Currently    Drug use: Not Currently     Types: Methamphetamines     Comment: sober 5 months    Sexual activity: Defer       Medications:     Current Outpatient Medications:     buprenorphine-naloxone (SUBOXONE) 8-2 MG per SL tablet, Place 2 tablets under the tongue Daily., Disp: 28 tablet, Rfl: 0    traZODone (DESYREL) 100 MG tablet, Take 1 tablet by mouth At Night As Needed for Sleep., Disp: 30 tablet, Rfl: 0    Sofosbuvir-Velpatasvir (Epclusa) 400-100 MG tablet, Take 1 tablet by mouth Daily. (Patient not taking: Reported on 11/20/2024), Disp: , Rfl:     Objective     Physical Exam:  Physical Exam  Vitals reviewed.   Constitutional:       General: He is not in acute distress.     Appearance: He is well-developed. He is not ill-appearing.   Pulmonary:      Effort: No respiratory distress.   Neurological:      Mental Status: He is alert and oriented to person, place, and time.      Gait:  Gait normal.   Psychiatric:         Attention and Perception: Attention normal.         Mood and Affect: Mood and affect normal.         Speech: Speech normal.         Behavior: Behavior normal. Behavior is cooperative.         Thought Content: Thought content is not paranoid or delusional. Thought content does not include homicidal or suicidal ideation. Thought content does not include homicidal or suicidal plan.         Cognition and Memory: Cognition and memory normal.         Vital Signs: There were no vitals filed for this visit.  There is no height or weight on file to calculate BMI.     Mental Status Exam:   Hygiene:   good  Cooperation:  Cooperative  Eye Contact:  Good  Psychomotor Behavior:  Appropriate  Affect:  Full range  Mood: normal  Speech:  Normal  Thought Process:  Goal directed  Thought Content:  Normal  Suicidal:  None  Homicidal:  None  Hallucinations:  None  Delusion:  None  Memory:  Intact  Orientation:  Person, Place, Time, and Situation  Reliability:  good  Insight:  Fair  Judgement:  Fair  Impulse Control:  Fair    Assessment / Plan      -Continue buprenorphine/naloxone 16-4 mg daily for opioid use disorder  -Will have RTC dose at next visit +0 remaining  -Advisement to take part in and remain active in 12 Step Recovery Meetings, IOP, and/or 1:1 therapy/counseling and to establish/maintain an active relationship with a recovery sponsor.   -Agreeable to continued monitoring and will request confirmations with levels on all preliminary positive results for patient safety, medication compliance, adherence to treatment plan, toxicity monitoring (when applicable), and planning of future care.   -Continue to follow 15/15/15 minute administration rule  -Safe medication storage encouraged  -Does not need Narcan refill today  -Continue trazodone 100mg nightly as needed for sleep.   -Will call for next round of hep c meds      Assessment & Plan   Problems Addressed this Visit    None  Visit Diagnoses        Insomnia, psychophysiological        Relevant Medications    traZODone (DESYREL) 100 MG tablet    Opioid use disorder, severe, on maintenance therapy        Relevant Medications    buprenorphine-naloxone (SUBOXONE) 8-2 MG per SL tablet          Diagnoses         Codes Comments    Insomnia, psychophysiological     ICD-10-CM: F51.04  ICD-9-CM: 307.42     Opioid use disorder, severe, on maintenance therapy     ICD-10-CM: F11.20  ICD-9-CM: 304.00             Visit Diagnoses:    ICD-10-CM ICD-9-CM   1. Insomnia, psychophysiological  F51.04 307.42   2. Opioid use disorder, severe, on maintenance therapy  F11.20 304.00       PLAN:  Safety: No acute safety concerns  Risk Assessment: Risk of self-harm acutely is low. Risk of self-harm chronically is also low, but could be further elevated in the event of treatment noncompliance and/or AODA.      TREATMENT PLAN: Continue supportive psychotherapy efforts and medications as indicated. Treatment and medication options discussed during today's visit. Patient acknowledged and verbally consented to continue with current treatment plan and was educated on the importance of compliance with treatment and follow-up appointments.    GOALS:  Short Term Goals: Patient will be compliant with medication, and patient will have no significant medication related side effects.  Patient will be engaged in psychotherapy as indicated.  Patient will report subjective improvement of symptoms.  Long term goals: To stabilize mood and treat/improve subjective symptoms, the patient will stay out of the hospital, the patient will be at an optimal level of functioning, and the patient will take all medications as prescribed.  The patient/guardian verbalized understanding and agreement with goals that were mutually set.      MEDICATION ISSUES:  CHARISSE reviewed as expected.  Discussed medication options and treatment plan of prescribed medication as well as the risks, benefits, and side effects  including potential falls, possible impaired driving and metabolic adversities among others. Patient is agreeable to call the office with any worsening of symptoms or onset of side effects. Patient is agreeable to call 911 or go to the nearest ER should he/she begin having SI/HI. No medication side effects or related complaints today.     MEDS ORDERED DURING VISIT:  New Medications Ordered This Visit   Medications    traZODone (DESYREL) 100 MG tablet     Sig: Take 1 tablet by mouth At Night As Needed for Sleep.     Dispense:  30 tablet     Refill:  0    buprenorphine-naloxone (SUBOXONE) 8-2 MG per SL tablet     Sig: Place 2 tablets under the tongue Daily.     Dispense:  28 tablet     Refill:  0     NADEAN:[id number]       Return in about 2 weeks (around 12/4/2024) for Follow Up Medication.           This document has been electronically signed by KRYSTYNA Strong  November 20, 2024 15:51 EST     Mode of Visit: Video  Location of patient: -OTHER-: Class  Location of provider: +[facility]  You have chosen to receive care through a telehealth visit.  The patient has signed the video visit consent form.  The visit included audio and video interaction. No technical issues occurred during this visit.      Part of this note may be an electronic transcription/translation of spoken language to printed text using the Dragon Dictation System.

## 2024-12-05 ENCOUNTER — TELEMEDICINE (OUTPATIENT)
Dept: PSYCHIATRY | Facility: CLINIC | Age: 49
End: 2024-12-05
Payer: MEDICAID

## 2024-12-05 DIAGNOSIS — F51.04 INSOMNIA, PSYCHOPHYSIOLOGICAL: ICD-10-CM

## 2024-12-05 DIAGNOSIS — F11.20 OPIOID USE DISORDER, SEVERE, ON MAINTENANCE THERAPY: ICD-10-CM

## 2024-12-05 RX ORDER — TRAZODONE HYDROCHLORIDE 100 MG/1
100 TABLET ORAL NIGHTLY PRN
Qty: 30 TABLET | Refills: 0 | Status: SHIPPED | OUTPATIENT
Start: 2024-12-05

## 2024-12-05 RX ORDER — BUPRENORPHINE HYDROCHLORIDE AND NALOXONE HYDROCHLORIDE DIHYDRATE 8; 2 MG/1; MG/1
2 TABLET SUBLINGUAL DAILY
Qty: 30 TABLET | Refills: 0 | Status: SHIPPED | OUTPATIENT
Start: 2024-12-05

## 2024-12-05 NOTE — PROGRESS NOTES
Telehealth Visit      This provider is located at [facility], [address] [facility] in [place], [place],using a secure [employer] Video Visit through [employer]. Patient is being seen remotely via telehealth at their home address in [place], and stated they are in a secure environment for this session. The patient's condition being diagnosed/treated is appropriate for telemedicine. The provider identified herself as well as her credentials. The patient, and/or patients guardian, consent to be seen remotely, and when consent is given they understand that the consent allows for patient identifiable information to be sent to a third party as needed. They may refuse to be seen remotely at any time. The electronic data is encrypted and password protected, and the patient and/or guardian has been advised of the potential risks to privacy not withstanding such measures.    Patient Name: Simon Pate  : 1975   MRN: [id number]     Referring Provider: Provider, No Known    Chief Complaint: Follow up on substance use    History of Present Illness:   Simon Pate is a 48 y.o. male who is here today for follow up related to MOUD. Taking buprenorphine/naloxone 16-4mg daily and is tolerating well with no adverse effects. Continues to do well in [facility]ri[facility] Denies any recurrence of illicit substance or alcohol use, cravings, or triggering events since last follow up. Attending recovery meetings or participating in recovery related content 5 a week. LFTs are current as of 2024. Trazodone 100mg nightly continues to be very helpful. Sleeping well and wakes feeling rested. Facility did not send over his last UDS. He will request again or will come in by Monday. No AE of meds. Needs to get 2nd round of Hep C meds. Will call previous prescriber.     Triggers: Anxiety    Cravings: Denies    Relapse Prevention: MOUD, sober living  programming    Urine Drug Screen (today's visit) discussed: Has UDS done today at facility.  Will have them faxed to our office or come in by Monday. Has to set up transit.    UDS Confirmation: 11/4/2024 positive buprenorphine, norbuprenorphine    CHARISSE (PDMP) Reviewed for Current/Active Medications: Buprenorphine/naloxone as expected    Past Surgical History:  Past Surgical History:   Procedure Laterality Date    CYST REMOVAL      MULTIPLE TOOTH EXTRACTIONS         Problem List:  There is no problem list on file for this patient.      Allergy:   Allergies   Allergen Reactions    Ibuprofen Hives and Itching        Current Medications:   Current Outpatient Medications   Medication Sig Dispense Refill    buprenorphine-naloxone (SUBOXONE) 8-2 MG per SL tablet Place 2 tablets under the tongue Daily. 30 tablet 0    traZODone (DESYREL) 100 MG tablet Take 1 tablet by mouth At Night As Needed for Sleep. 30 tablet 0    Sofosbuvir-Velpatasvir (Epclusa) 400-100 MG tablet Take 1 tablet by mouth Daily. (Patient not taking: Reported on 11/20/2024)       No current facility-administered medications for this visit.       Past Medical History:  Past Medical History:   Diagnosis Date    Substance abuse        Social History:  Social History     Socioeconomic History    Marital status: Single   Tobacco Use    Smoking status: Every Day     Current packs/day: 2.00     Average packs/day: 2.0 packs/day for 20.9 years (41.9 ttl pk-yrs)     Types: Cigarettes     Start date: 2004     Passive exposure: Current    Smokeless tobacco: Never   Vaping Use    Vaping status: Every Day    Substances: Nicotine, Flavoring    Devices: Pre-filled or refillable cartridge, Refillable tank    Passive vaping exposure: Yes   Substance and Sexual Activity    Alcohol use: Not Currently    Drug use: Not Currently     Types: Methamphetamines     Comment: sober 5 months    Sexual activity: Defer       Family History:  History reviewed. No pertinent family  history.      Subjective      Review of Systems:   Review of Systems   Constitutional:  Negative for chills, fatigue and fever.   Respiratory:  Negative for shortness of breath.    Cardiovascular:  Negative for chest pain.   Gastrointestinal:  Negative for abdominal pain.   Skin:  Negative for skin lesions.   Neurological:  Negative for seizures and confusion.   Psychiatric/Behavioral:  Positive for stress. Negative for hallucinations, sleep disturbance, suicidal ideas and depressed mood. The patient is not nervous/anxious.        PHQ-9 Depression Screening  Little interest or pleasure in doing things? (Patient-Rptd) Several days   Feeling down, depressed, or hopeless? (Patient-Rptd) Several days   PHQ-2 Total Score (Patient-Rptd) 2   Trouble falling or staying asleep, or sleeping too much? (Patient-Rptd) Several days   Feeling tired or having little energy? (Patient-Rptd) Several days   Poor appetite or overeating? (Patient-Rptd) Not at all   Feeling bad about yourself - or that you are a failure or have let yourself or your family down? (Patient-Rptd) Several days   Trouble concentrating on things, such as reading the newspaper or watching television? (Patient-Rptd) Several days   Moving or speaking so slowly that other people could have noticed? Or the opposite - being so fidgety or restless that you have been moving around a lot more than usual? (Patient-Rptd) Several days   Thoughts that you would be better off dead, or of hurting yourself in some way? (Patient-Rptd) Not at all   PHQ-9 Total Score (Patient-Rptd) 7   If you checked off any problems, how difficult have these problems made it for you to do your work, take care of things at home, or get along with other people? (Patient-Rptd) Very difficult         VIRGILIO-7 Score:   Feeling nervous, anxious or on edge: (Patient-Rptd) Several days  Not being able to stop or control worrying: (Patient-Rptd) Several days  Worrying too much about different things:  (Patient-Rptd) Several days  Trouble Relaxing: (Patient-Rptd) Several days  Being so restless that it is hard to sit still: (Patient-Rptd) Several days  Feeling afraid as if something awful might happen: (Patient-Rptd) Not at all  Becoming easily annoyed or irritable: (Patient-Rptd) Several days  VIRGILIO 7 Total Score: (Patient-Rptd) 6  If you checked any problems, how difficult have these problems made it for you to do your work, take care of things at home, or get along with other people: (Patient-Rptd) Somewhat difficult    Patient History:   The following portions of the patient's history were reviewed and updated as appropriate: allergies, current medications, past family history, past medical history, past social history, past surgical history and problem list.     Social:  Social History     Socioeconomic History    Marital status: Single   Tobacco Use    Smoking status: Every Day     Current packs/day: 2.00     Average packs/day: 2.0 packs/day for 20.9 years (41.9 ttl pk-yrs)     Types: Cigarettes     Start date: 2004     Passive exposure: Current    Smokeless tobacco: Never   Vaping Use    Vaping status: Every Day    Substances: Nicotine, Flavoring    Devices: Pre-filled or refillable cartridge, Refillable tank    Passive vaping exposure: Yes   Substance and Sexual Activity    Alcohol use: Not Currently    Drug use: Not Currently     Types: Methamphetamines     Comment: sober 5 months    Sexual activity: Defer       Medications:     Current Outpatient Medications:     buprenorphine-naloxone (SUBOXONE) 8-2 MG per SL tablet, Place 2 tablets under the tongue Daily., Disp: 30 tablet, Rfl: 0    traZODone (DESYREL) 100 MG tablet, Take 1 tablet by mouth At Night As Needed for Sleep., Disp: 30 tablet, Rfl: 0    Sofosbuvir-Velpatasvir (Epclusa) 400-100 MG tablet, Take 1 tablet by mouth Daily. (Patient not taking: Reported on 11/20/2024), Disp: , Rfl:     Objective     Physical Exam:  Physical Exam  Vitals reviewed.    Constitutional:       General: He is not in acute distress.     Appearance: He is well-developed. He is not ill-appearing.   Pulmonary:      Effort: No respiratory distress.   Neurological:      Mental Status: He is alert and oriented to person, place, and time.      Gait: Gait normal.   Psychiatric:         Attention and Perception: Attention normal.         Mood and Affect: Mood and affect normal.         Speech: Speech normal.         Behavior: Behavior normal. Behavior is cooperative.         Thought Content: Thought content is not paranoid or delusional. Thought content does not include homicidal or suicidal ideation. Thought content does not include homicidal or suicidal plan.         Cognition and Memory: Cognition and memory normal.         Vital Signs: There were no vitals filed for this visit.  There is no height or weight on file to calculate BMI.     Mental Status Exam: Reviewed 12/5/2024  Hygiene:   good  Cooperation:  Cooperative  Eye Contact:  Good  Psychomotor Behavior:  Appropriate  Affect:  Full range  Mood: normal  Speech:  Normal  Thought Process:  Goal directed  Thought Content:  Normal  Suicidal:  None  Homicidal:  None  Hallucinations:  None  Delusion:  None  Memory:  Intact  Orientation:  Person, Place, Time, and Situation  Reliability:  good  Insight:  Fair  Judgement:  Fair  Impulse Control:  Fair    Assessment / Plan      -Continue buprenorphine/naloxone 16-4 mg daily for opioid use disorder.  Doing well in his sobriety.  -Will have RTC dose at next visit +0 remaining.  Did not have dose for today due to missed appointment.  -Advisement to take part in and remain active in 12 Step Recovery Meetings, IOP, and/or 1:1 therapy/counseling and to establish/maintain an active relationship with a recovery sponsor.   -Agreeable to continued monitoring and will request confirmations with levels on all preliminary positive results for patient safety, medication compliance, adherence to treatment  plan, toxicity monitoring (when applicable), and planning of future care.   -Continue to follow 15/15/15 minute administration rule  -Safe medication storage encouraged  -Does not need Narcan refill today  -Continue trazodone 100mg nightly as needed for sleep.  Medication working well.  Wakes feeling rested.  -Will call for next round of hep c meds.  Discussed we will keep on 2-week follow-ups until we can get UDS on file and resume hep C medication.      Assessment & Plan   Problems Addressed this Visit    None  Visit Diagnoses       Opioid use disorder, severe, on maintenance therapy        Relevant Medications    buprenorphine-naloxone (SUBOXONE) 8-2 MG per SL tablet    Insomnia, psychophysiological        Relevant Medications    traZODone (DESYREL) 100 MG tablet          Diagnoses         Codes Comments    Opioid use disorder, severe, on maintenance therapy     ICD-10-CM: F11.20  ICD-9-CM: 304.00     Insomnia, psychophysiological     ICD-10-CM: F51.04  ICD-9-CM: 307.42             Visit Diagnoses:    ICD-10-CM ICD-9-CM   1. Opioid use disorder, severe, on maintenance therapy  F11.20 304.00   2. Insomnia, psychophysiological  F51.04 307.42         PLAN:  Safety: No acute safety concerns  Risk Assessment: Risk of self-harm acutely is low. Risk of self-harm chronically is also low, but could be further elevated in the event of treatment noncompliance and/or AODA.      TREATMENT PLAN: Continue supportive psychotherapy efforts and medications as indicated. Treatment and medication options discussed during today's visit. Patient acknowledged and verbally consented to continue with current treatment plan and was educated on the importance of compliance with treatment and follow-up appointments.    GOALS:  Short Term Goals: Patient will be compliant with medication, and patient will have no significant medication related side effects.  Patient will be engaged in psychotherapy as indicated.  Patient will report  subjective improvement of symptoms.  Long term goals: To stabilize mood and treat/improve subjective symptoms, the patient will stay out of the hospital, the patient will be at an optimal level of functioning, and the patient will take all medications as prescribed.  The patient/guardian verbalized understanding and agreement with goals that were mutually set.      MEDICATION ISSUES:  CHARISSE reviewed as expected.  Discussed medication options and treatment plan of prescribed medication as well as the risks, benefits, and side effects including potential falls, possible impaired driving and metabolic adversities among others. Patient is agreeable to call the office with any worsening of symptoms or onset of side effects. Patient is agreeable to call 911 or go to the nearest ER should he/she begin having SI/HI. No medication side effects or related complaints today.     MEDS ORDERED DURING VISIT:  New Medications Ordered This Visit   Medications    buprenorphine-naloxone (SUBOXONE) 8-2 MG per SL tablet     Sig: Place 2 tablets under the tongue Daily.     Dispense:  30 tablet     Refill:  0     NADEAN:[id number]    traZODone (DESYREL) 100 MG tablet     Sig: Take 1 tablet by mouth At Night As Needed for Sleep.     Dispense:  30 tablet     Refill:  0       Return in about 2 weeks (around 12/19/2024) for Follow Up Medication.           This document has been electronically signed by KRYSTYNA Strong  December 5, 2024 10:48 EST     Mode of Visit: Video  Location of patient: -HOME-  Location of provider: +HOME+  You have chosen to receive care through a telehealth visit.  The patient has signed the video visit consent form.  The visit included audio and video interaction. No technical issues occurred during this visit.      Part of this note may be an electronic transcription/translation of spoken language to printed text using the Dragon Dictation System.

## 2024-12-19 ENCOUNTER — TELEMEDICINE (OUTPATIENT)
Dept: PSYCHIATRY | Facility: CLINIC | Age: 49
End: 2024-12-19
Payer: MEDICAID

## 2024-12-19 ENCOUNTER — LAB (OUTPATIENT)
Dept: LAB | Facility: HOSPITAL | Age: 49
End: 2024-12-19
Payer: MEDICAID

## 2024-12-19 DIAGNOSIS — F33.1 MODERATE EPISODE OF RECURRENT MAJOR DEPRESSIVE DISORDER: ICD-10-CM

## 2024-12-19 DIAGNOSIS — F11.20 OPIOID USE DISORDER, SEVERE, ON MAINTENANCE THERAPY: Primary | ICD-10-CM

## 2024-12-19 DIAGNOSIS — B18.2 CHRONIC HEPATITIS C WITHOUT HEPATIC COMA: ICD-10-CM

## 2024-12-19 DIAGNOSIS — F11.20 OPIOID USE DISORDER, SEVERE, ON MAINTENANCE THERAPY: ICD-10-CM

## 2024-12-19 DIAGNOSIS — F51.04 INSOMNIA, PSYCHOPHYSIOLOGICAL: ICD-10-CM

## 2024-12-19 DIAGNOSIS — F41.1 GENERALIZED ANXIETY DISORDER: ICD-10-CM

## 2024-12-19 DIAGNOSIS — F15.21 METHAMPHETAMINE USE DISORDER, SEVERE, IN EARLY REMISSION: ICD-10-CM

## 2024-12-19 DIAGNOSIS — F43.10 POST TRAUMATIC STRESS DISORDER (PTSD): ICD-10-CM

## 2024-12-19 LAB
ALBUMIN SERPL-MCNC: 4.4 G/DL (ref 3.5–5.2)
ALBUMIN/GLOB SERPL: 1.5 G/DL
ALP SERPL-CCNC: 74 U/L (ref 39–117)
ALT SERPL W P-5'-P-CCNC: 17 U/L (ref 1–41)
AMPHET+METHAMPHET UR QL: NEGATIVE
AMPHETAMINES UR QL: NEGATIVE
ANION GAP SERPL CALCULATED.3IONS-SCNC: 12 MMOL/L (ref 5–15)
AST SERPL-CCNC: 19 U/L (ref 1–40)
BARBITURATES UR QL SCN: NEGATIVE
BASOPHILS # BLD AUTO: 0.06 10*3/MM3 (ref 0–0.2)
BASOPHILS NFR BLD AUTO: 0.9 % (ref 0–1.5)
BENZODIAZ UR QL SCN: NEGATIVE
BILIRUB SERPL-MCNC: 0.2 MG/DL (ref 0–1.2)
BUN SERPL-MCNC: 14 MG/DL (ref 6–20)
BUN/CREAT SERPL: 13.7 (ref 7–25)
BUPRENORPHINE SERPL-MCNC: POSITIVE NG/ML
CALCIUM SPEC-SCNC: 9.4 MG/DL (ref 8.6–10.5)
CANNABINOIDS SERPL QL: NEGATIVE
CHLORIDE SERPL-SCNC: 103 MMOL/L (ref 98–107)
CO2 SERPL-SCNC: 25 MMOL/L (ref 22–29)
COCAINE UR QL: NEGATIVE
CREAT SERPL-MCNC: 1.02 MG/DL (ref 0.76–1.27)
DEPRECATED RDW RBC AUTO: 43.2 FL (ref 37–54)
EGFRCR SERPLBLD CKD-EPI 2021: 90.7 ML/MIN/1.73
EOSINOPHIL # BLD AUTO: 0.34 10*3/MM3 (ref 0–0.4)
EOSINOPHIL NFR BLD AUTO: 5.1 % (ref 0.3–6.2)
ERYTHROCYTE [DISTWIDTH] IN BLOOD BY AUTOMATED COUNT: 12.7 % (ref 12.3–15.4)
GLOBULIN UR ELPH-MCNC: 2.9 GM/DL
GLUCOSE SERPL-MCNC: 79 MG/DL (ref 65–99)
HAV IGM SERPL QL IA: ABNORMAL
HBV CORE IGM SERPL QL IA: ABNORMAL
HBV SURFACE AG SERPL QL IA: ABNORMAL
HCT VFR BLD AUTO: 42.9 % (ref 37.5–51)
HCV AB SER QL: REACTIVE
HGB BLD-MCNC: 13.9 G/DL (ref 13–17.7)
HIV 1+2 AB+HIV1 P24 AG SERPL QL IA: NORMAL
IMM GRANULOCYTES # BLD AUTO: 0.01 10*3/MM3 (ref 0–0.05)
IMM GRANULOCYTES NFR BLD AUTO: 0.1 % (ref 0–0.5)
LYMPHOCYTES # BLD AUTO: 2.99 10*3/MM3 (ref 0.7–3.1)
LYMPHOCYTES NFR BLD AUTO: 44.5 % (ref 19.6–45.3)
MCH RBC QN AUTO: 29.6 PG (ref 26.6–33)
MCHC RBC AUTO-ENTMCNC: 32.4 G/DL (ref 31.5–35.7)
MCV RBC AUTO: 91.3 FL (ref 79–97)
METHADONE UR QL SCN: NEGATIVE
MONOCYTES # BLD AUTO: 0.39 10*3/MM3 (ref 0.1–0.9)
MONOCYTES NFR BLD AUTO: 5.8 % (ref 5–12)
NEUTROPHILS NFR BLD AUTO: 2.93 10*3/MM3 (ref 1.7–7)
NEUTROPHILS NFR BLD AUTO: 43.6 % (ref 42.7–76)
NRBC BLD AUTO-RTO: 0 /100 WBC (ref 0–0.2)
OPIATES UR QL: NEGATIVE
OXYCODONE UR QL SCN: NEGATIVE
PCP UR QL SCN: NEGATIVE
PLATELET # BLD AUTO: 185 10*3/MM3 (ref 140–450)
PMV BLD AUTO: 10.6 FL (ref 6–12)
POTASSIUM SERPL-SCNC: 3.9 MMOL/L (ref 3.5–5.2)
PROT SERPL-MCNC: 7.3 G/DL (ref 6–8.5)
RBC # BLD AUTO: 4.7 10*6/MM3 (ref 4.14–5.8)
SODIUM SERPL-SCNC: 140 MMOL/L (ref 136–145)
TRICYCLICS UR QL SCN: NEGATIVE
TSH SERPL DL<=0.05 MIU/L-ACNC: 2.18 UIU/ML (ref 0.27–4.2)
WBC NRBC COR # BLD AUTO: 6.72 10*3/MM3 (ref 3.4–10.8)

## 2024-12-19 PROCEDURE — 86592 SYPHILIS TEST NON-TREP QUAL: CPT

## 2024-12-19 PROCEDURE — 80053 COMPREHEN METABOLIC PANEL: CPT

## 2024-12-19 PROCEDURE — 80306 DRUG TEST PRSMV INSTRMNT: CPT

## 2024-12-19 PROCEDURE — 80074 ACUTE HEPATITIS PANEL: CPT

## 2024-12-19 PROCEDURE — 84443 ASSAY THYROID STIM HORMONE: CPT

## 2024-12-19 PROCEDURE — 87522 HEPATITIS C REVRS TRNSCRPJ: CPT

## 2024-12-19 PROCEDURE — G0432 EIA HIV-1/HIV-2 SCREEN: HCPCS

## 2024-12-19 PROCEDURE — 85025 COMPLETE CBC W/AUTO DIFF WBC: CPT

## 2024-12-19 PROCEDURE — 36415 COLL VENOUS BLD VENIPUNCTURE: CPT

## 2024-12-19 RX ORDER — BUPRENORPHINE HYDROCHLORIDE AND NALOXONE HYDROCHLORIDE DIHYDRATE 8; 2 MG/1; MG/1
2 TABLET SUBLINGUAL DAILY
Qty: 28 TABLET | Refills: 0 | Status: SHIPPED | OUTPATIENT
Start: 2024-12-19

## 2024-12-19 NOTE — PROGRESS NOTES
Telehealth Visit      This provider is located at [facility], [address] [facility] in [place], [place],using a secure MyChart Video Visit through [employer]. Patient is being seen remotely via telehealth at their home address in [place], and stated they are in a secure environment for this session. The patient's condition being diagnosed/treated is appropriate for telemedicine. The provider identified herself as well as her credentials. The patient, and/or patients guardian, consent to be seen remotely, and when consent is given they understand that the consent allows for patient identifiable information to be sent to a third party as needed. They may refuse to be seen remotely at any time. The electronic data is encrypted and password protected, and the patient and/or guardian has been advised of the potential risks to privacy not withstanding such measures.    Patient Name: Simon Pate  : 1975   MRN: [id number]     Referring Provider: Provider, No Known    Chief Complaint: Follow up on substance use    History of Present Illness:   Simon Pate is a 49 y.o. male who is here today for follow up related to MOUD. Taking buprenorphine/naloxone 16-4mg daily and is tolerating well with no adverse effects. Continues to do well in his sobriety and is still in care at sober living. Sober since 2024. Denies any recurrence of illicit substance or alcohol use, cravings, or triggering events since last follow up. Attending recovery meetings or participating in recovery related content 5 a week. LFTs are current as of 2024. Trazodone 100mg nightly continues to be very helpful. Sleeping well and wakes feeling rested. Facility did not send over his last UDS again and he forgot to schedule transportation. He will call [employer] today at lunch and set up transportation for [place]. Still needs to get 2nd round of Hep C meds and has not called previous prescriber.   Denies any SI/HI, AVH.  No other complaints today    Triggers: Anxiety    Cravings: Denies    Relapse Prevention: MOUD, sober living programming    Urine Drug Screen (today's visit) discussed: Has UDS done today at facility.  Will have them faxed to our office or come in by Monday. Has to set up transit.    UDS Confirmation: 11/4/2024 positive buprenorphine, norbuprenorphine    CHARISSE (PDMP) Reviewed for Current/Active Medications: Buprenorphine/naloxone as expected    Past Surgical History:  Past Surgical History:   Procedure Laterality Date    CYST REMOVAL      MULTIPLE TOOTH EXTRACTIONS         Problem List:  There is no problem list on file for this patient.      Allergy:   Allergies   Allergen Reactions    Ibuprofen Hives and Itching        Current Medications:   Current Outpatient Medications   Medication Sig Dispense Refill    buprenorphine-naloxone (SUBOXONE) 8-2 MG per SL tablet Place 2 tablets under the tongue Daily. 28 tablet 0    Sofosbuvir-Velpatasvir (Epclusa) 400-100 MG tablet Take 1 tablet by mouth Daily. (Patient not taking: Reported on 11/20/2024)      traZODone (DESYREL) 100 MG tablet Take 1 tablet by mouth At Night As Needed for Sleep. 30 tablet 0     No current facility-administered medications for this visit.       Past Medical History:  Past Medical History:   Diagnosis Date    Substance abuse        Social History:  Social History     Socioeconomic History    Marital status: Single   Tobacco Use    Smoking status: Every Day     Current packs/day: 2.00     Average packs/day: 2.0 packs/day for 21.0 years (41.9 ttl pk-yrs)     Types: Cigarettes     Start date: 2004     Passive exposure: Current    Smokeless tobacco: Never   Vaping Use    Vaping status: Every Day    Substances: Nicotine, Flavoring    Devices: Pre-filled or refillable cartridge, Refillable tank    Passive vaping exposure: Yes   Substance and Sexual Activity    Alcohol use: Not Currently    Drug use: Not Currently     Types:  Methamphetamines     Comment: sober 5 months    Sexual activity: Defer       Family History:  History reviewed. No pertinent family history.      Subjective      Review of Systems:   Review of Systems   Constitutional:  Negative for chills, fatigue and fever.   Respiratory:  Negative for shortness of breath.    Cardiovascular:  Negative for chest pain.   Gastrointestinal:  Negative for abdominal pain.   Skin:  Negative for skin lesions.   Neurological:  Negative for seizures and confusion.   Psychiatric/Behavioral:  Positive for stress. Negative for hallucinations, sleep disturbance, suicidal ideas and depressed mood. The patient is not nervous/anxious.        PHQ-9 Depression Screening  Little interest or pleasure in doing things? (Patient-Rptd) Several days   Feeling down, depressed, or hopeless? (Patient-Rptd) Several days   PHQ-2 Total Score (Patient-Rptd) 2   Trouble falling or staying asleep, or sleeping too much? (Patient-Rptd) Not at all   Feeling tired or having little energy? (Patient-Rptd) Not at all   Poor appetite or overeating? (Patient-Rptd) Not at all   Feeling bad about yourself - or that you are a failure or have let yourself or your family down? (Patient-Rptd) Several days   Trouble concentrating on things, such as reading the newspaper or watching television? (Patient-Rptd) Over half   Moving or speaking so slowly that other people could have noticed? Or the opposite - being so fidgety or restless that you have been moving around a lot more than usual? (Patient-Rptd) Several days   Thoughts that you would be better off dead, or of hurting yourself in some way? (Patient-Rptd) Not at all   PHQ-9 Total Score (Patient-Rptd) 6   If you checked off any problems, how difficult have these problems made it for you to do your work, take care of things at home, or get along with other people? (Patient-Rptd) Not difficult at all         VIRGILIO-7 Score:   Feeling nervous, anxious or on edge: (Patient-Rptd)  Several days  Not being able to stop or control worrying: (Patient-Rptd) Not at all  Worrying too much about different things: (Patient-Rptd) Several days  Trouble Relaxing: (Patient-Rptd) Several days  Being so restless that it is hard to sit still: (Patient-Rptd) Several days  Feeling afraid as if something awful might happen: (Patient-Rptd) Not at all  Becoming easily annoyed or irritable: (Patient-Rptd) Several days  VIRGILIO 7 Total Score: (Patient-Rptd) 5  If you checked any problems, how difficult have these problems made it for you to do your work, take care of things at home, or get along with other people: (Patient-Rptd) Somewhat difficult    Patient History:   The following portions of the patient's history were reviewed and updated as appropriate: allergies, current medications, past family history, past medical history, past social history, past surgical history and problem list.     Social:  Social History     Socioeconomic History    Marital status: Single   Tobacco Use    Smoking status: Every Day     Current packs/day: 2.00     Average packs/day: 2.0 packs/day for 21.0 years (41.9 ttl pk-yrs)     Types: Cigarettes     Start date: 2004     Passive exposure: Current    Smokeless tobacco: Never   Vaping Use    Vaping status: Every Day    Substances: Nicotine, Flavoring    Devices: Pre-filled or refillable cartridge, Refillable tank    Passive vaping exposure: Yes   Substance and Sexual Activity    Alcohol use: Not Currently    Drug use: Not Currently     Types: Methamphetamines     Comment: sober 5 months    Sexual activity: Defer       Medications:     Current Outpatient Medications:     buprenorphine-naloxone (SUBOXONE) 8-2 MG per SL tablet, Place 2 tablets under the tongue Daily., Disp: 28 tablet, Rfl: 0    Sofosbuvir-Velpatasvir (Epclusa) 400-100 MG tablet, Take 1 tablet by mouth Daily. (Patient not taking: Reported on 11/20/2024), Disp: , Rfl:     traZODone (DESYREL) 100 MG tablet, Take 1 tablet by  mouth At Night As Needed for Sleep., Disp: 30 tablet, Rfl: 0    Objective     Physical Exam:  Physical Exam  Vitals reviewed.   Constitutional:       General: He is not in acute distress.     Appearance: He is well-developed. He is not ill-appearing.   Pulmonary:      Effort: No respiratory distress.   Neurological:      Mental Status: He is alert and oriented to person, place, and time.      Gait: Gait normal.   Psychiatric:         Attention and Perception: Attention normal.         Mood and Affect: Mood and affect normal.         Speech: Speech normal.         Behavior: Behavior normal. Behavior is cooperative.         Thought Content: Thought content is not paranoid or delusional. Thought content does not include homicidal or suicidal ideation. Thought content does not include homicidal or suicidal plan.         Cognition and Memory: Cognition and memory normal.         Vital Signs: There were no vitals filed for this visit.  There is no height or weight on file to calculate BMI.     Mental Status Exam: Reviewed 12/19/2024  Hygiene:   good  Cooperation:  Cooperative  Eye Contact:  Good  Psychomotor Behavior:  Appropriate  Affect:  Full range  Mood: normal  Speech:  Normal  Thought Process:  Goal directed  Thought Content:  Normal  Suicidal:  None  Homicidal:  None  Hallucinations:  None  Delusion:  None  Memory:  Intact  Orientation:  Person, Place, Time, and Situation  Reliability:  good  Insight:  Fair  Judgement:  Fair  Impulse Control:  Fair    Assessment / Plan      -Continue buprenorphine/naloxone 16-4 mg daily for opioid use disorder  -Will have RTC dose at next visit +0 remaining  -Advisement to take part in and remain active in 12 Step Recovery Meetings, IOP, and/or 1:1 therapy/counseling and to establish/maintain an active relationship with a recovery sponsor.   -Agreeable to continued monitoring and will request confirmations with levels on all preliminary positive results for patient safety,  medication compliance, adherence to treatment plan, toxicity monitoring (when applicable), and planning of future care.   -Continue to follow 15/15/15 minute administration rule  -Safe medication storage encouraged  -Does not need Narcan refill today  -Continue trazodone 100mg nightly as needed for sleep.  Medication working well.  Wakes feeling rested.  -Will reorder baseline labs and HCV RNA to see if hep C is still active.  He has neglected to get remaining medication filled.  Will refer to our hep C clinic if he still needs treatment.  We will keep on 2 weeks until he can get this done and have UDS on file.      Assessment & Plan   Problems Addressed this Visit    None  Visit Diagnoses       Opioid use disorder, severe, on maintenance therapy    -  Primary    Relevant Medications    buprenorphine-naloxone (SUBOXONE) 8-2 MG per SL tablet    Other Relevant Orders    CBC & Differential    Comprehensive Metabolic Panel    HCV RNA By PCR, Qn Rfx Sandy    Hepatitis Panel, Acute    HIV-1 / O / 2 Ag / Antibody    RPR Qualitative with Reflex to Quant    TSH    Insomnia, psychophysiological        Relevant Orders    CBC & Differential    Comprehensive Metabolic Panel    HCV RNA By PCR, Qn Rfx Sandy    Hepatitis Panel, Acute    HIV-1 / O / 2 Ag / Antibody    RPR Qualitative with Reflex to Quant    TSH    Methamphetamine use disorder, severe, in early remission        Relevant Orders    CBC & Differential    Comprehensive Metabolic Panel    HCV RNA By PCR, Qn Rfx Sandy    Hepatitis Panel, Acute    HIV-1 / O / 2 Ag / Antibody    RPR Qualitative with Reflex to Quant    TSH    Generalized anxiety disorder        Relevant Orders    CBC & Differential    Comprehensive Metabolic Panel    HCV RNA By PCR, Qn Rfx Sandy    Hepatitis Panel, Acute    HIV-1 / O / 2 Ag / Antibody    RPR Qualitative with Reflex to Quant    TSH    Chronic hepatitis C without hepatic coma        Relevant Orders    CBC & Differential    Comprehensive Metabolic  Panel    HCV RNA By PCR, Qn Rfx Sandy    Hepatitis Panel, Acute    HIV-1 / O / 2 Ag / Antibody    RPR Qualitative with Reflex to Quant    TSH          Diagnoses         Codes Comments    Opioid use disorder, severe, on maintenance therapy    -  Primary ICD-10-CM: F11.20  ICD-9-CM: 304.00     Insomnia, psychophysiological     ICD-10-CM: F51.04  ICD-9-CM: 307.42     Methamphetamine use disorder, severe, in early remission     ICD-10-CM: F15.21  ICD-9-CM: 304.43     Generalized anxiety disorder     ICD-10-CM: F41.1  ICD-9-CM: 300.02     Chronic hepatitis C without hepatic coma     ICD-10-CM: B18.2  ICD-9-CM: 070.54             Visit Diagnoses:    ICD-10-CM ICD-9-CM   1. Opioid use disorder, severe, on maintenance therapy  F11.20 304.00   2. Insomnia, psychophysiological  F51.04 307.42   3. Methamphetamine use disorder, severe, in early remission  F15.21 304.43   4. Generalized anxiety disorder  F41.1 300.02   5. Chronic hepatitis C without hepatic coma  B18.2 070.54           PLAN:  Safety: No acute safety concerns  Risk Assessment: Risk of self-harm acutely is low. Risk of self-harm chronically is also low, but could be further elevated in the event of treatment noncompliance and/or AODA.      TREATMENT PLAN: Continue supportive psychotherapy efforts and medications as indicated. Treatment and medication options discussed during today's visit. Patient acknowledged and verbally consented to continue with current treatment plan and was educated on the importance of compliance with treatment and follow-up appointments.    GOALS:  Short Term Goals: Patient will be compliant with medication, and patient will have no significant medication related side effects.  Patient will be engaged in psychotherapy as indicated.  Patient will report subjective improvement of symptoms.  Long term goals: To stabilize mood and treat/improve subjective symptoms, the patient will stay out of the hospital, the patient will be at an optimal  level of functioning, and the patient will take all medications as prescribed.  The patient/guardian verbalized understanding and agreement with goals that were mutually set.      MEDICATION ISSUES:  CHARISSE reviewed as expected.  Discussed medication options and treatment plan of prescribed medication as well as the risks, benefits, and side effects including potential falls, possible impaired driving and metabolic adversities among others. Patient is agreeable to call the office with any worsening of symptoms or onset of side effects. Patient is agreeable to call 911 or go to the nearest ER should he/she begin having SI/HI. No medication side effects or related complaints today.     MEDS ORDERED DURING VISIT:  New Medications Ordered This Visit   Medications    buprenorphine-naloxone (SUBOXONE) 8-2 MG per SL tablet     Sig: Place 2 tablets under the tongue Daily.     Dispense:  28 tablet     Refill:  0     NADEAN:[id number]       Return in about 2 weeks (around 1/2/2025) for Follow Up Medication, Telehealth Visit.           This document has been electronically signed by KRYSTYNA Strong  December 19, 2024 08:31 EST     Mode of Visit: Video  Location of patient: -HOME-  Location of provider: +HOME+  You have chosen to receive care through a telehealth visit.  The patient has signed the video visit consent form.  The visit included audio and video interaction. No technical issues occurred during this visit.      Part of this note may be an electronic transcription/translation of spoken language to printed text using the Dragon Dictation System.

## 2024-12-20 LAB — RPR SER QL: NORMAL

## 2024-12-23 LAB
HCV GENTYP SERPL NAA+PROBE: NORMAL
HCV RNA SERPL NAA+PROBE-ACNC: NORMAL IU/ML
HCV RNA SERPL NAA+PROBE-LOG IU: NORMAL LOG10 IU/ML
LABORATORY COMMENT REPORT: NORMAL

## 2024-12-26 LAB — REF LAB TEST METHOD: NORMAL

## 2024-12-31 ENCOUNTER — TELEMEDICINE (OUTPATIENT)
Dept: PSYCHIATRY | Facility: CLINIC | Age: 49
End: 2024-12-31
Payer: MEDICAID

## 2024-12-31 DIAGNOSIS — F11.20 OPIOID USE DISORDER, SEVERE, ON MAINTENANCE THERAPY: ICD-10-CM

## 2024-12-31 DIAGNOSIS — F51.04 INSOMNIA, PSYCHOPHYSIOLOGICAL: ICD-10-CM

## 2024-12-31 RX ORDER — TRAZODONE HYDROCHLORIDE 100 MG/1
100 TABLET ORAL NIGHTLY PRN
Qty: 30 TABLET | Refills: 1 | Status: SHIPPED | OUTPATIENT
Start: 2024-12-31

## 2024-12-31 RX ORDER — BUPRENORPHINE HYDROCHLORIDE AND NALOXONE HYDROCHLORIDE DIHYDRATE 8; 2 MG/1; MG/1
2 TABLET SUBLINGUAL DAILY
Qty: 52 TABLET | Refills: 0 | Status: SHIPPED | OUTPATIENT
Start: 2024-12-31

## 2024-12-31 NOTE — PROGRESS NOTES
Telehealth Visit      This provider is located at [facility], [facility], [address] [facility] in [place], [place],using a secure MyChart Video Visit through [employer]. Patient is being seen remotely via telehealth at their home address in [place], and stated they are in a secure environment for this session. The patient's condition being diagnosed/treated is appropriate for telemedicine. The provider identified herself as well as her credentials. The patient, and/or patients guardian, consent to be seen remotely, and when consent is given they understand that the consent allows for patient identifiable information to be sent to a third party as needed. They may refuse to be seen remotely at any time. The electronic data is encrypted and password protected, and the patient and/or guardian has been advised of the potential risks to privacy not withstanding such measures.    Patient Name: Simon Pate  : 1975   MRN: [id number]     Referring Provider: Provider, No Known    Chief Complaint: Follow up on substance use    History of Present Illness:   Simon Pate is a 49 y.o. male who is here today for follow up related to MOUD. Taking buprenorphine/naloxone 16-4mg daily and is tolerating well with no adverse effects. Continues to do well in his sobriety and is still in care at sober living. Sober since 2024. Denies any recurrence of illicit substance or alcohol use, cravings, or triggering events since last follow up. Attending recovery meetings or participating in recovery related content 5 a week. LFTs are current as of 2024. Trazodone 100mg nightly continues to be very helpful. Sleeping well and wakes feeling rested. Looking into moving out of sober living. Dealing with housing entities has been frustrating. [place] was good. First time he and significant other were together and sober. Recent HCV RNA negative. Did not complete last month of Epclusa.  Denies any SI/HI, AVH.  No other complaints today    Triggers: Anxiety    Cravings: Denies    Relapse Prevention: MOUD, sober living programming    Urine Drug Screen (today's visit) discussed: Will come in prior to next visit    UDS Confirmation: 12/19/2024 positive buprenorphine, norbuprenorphine    CHARISSE (PDMP) Reviewed for Current/Active Medications: Buprenorphine/naloxone as expected.  Has 4 tablets remaining today.    Past Surgical History:  Past Surgical History:   Procedure Laterality Date    CYST REMOVAL      MULTIPLE TOOTH EXTRACTIONS         Problem List:  There is no problem list on file for this patient.      Allergy:   Allergies   Allergen Reactions    Ibuprofen Hives and Itching        Current Medications:   Current Outpatient Medications   Medication Sig Dispense Refill    buprenorphine-naloxone (SUBOXONE) 8-2 MG per SL tablet Place 2 tablets under the tongue Daily. 52 tablet 0    traZODone (DESYREL) 100 MG tablet Take 1 tablet by mouth At Night As Needed for Sleep. 30 tablet 1    Sofosbuvir-Velpatasvir (Epclusa) 400-100 MG tablet Take 1 tablet by mouth Daily. (Patient not taking: Reported on 12/31/2024)       No current facility-administered medications for this visit.       Past Medical History:  Past Medical History:   Diagnosis Date    Substance abuse        Social History:  Social History     Socioeconomic History    Marital status: Single   Tobacco Use    Smoking status: Every Day     Current packs/day: 2.00     Average packs/day: 2.0 packs/day for 21.0 years (42.0 ttl pk-yrs)     Types: Cigarettes     Start date: 2004     Passive exposure: Current    Smokeless tobacco: Never   Vaping Use    Vaping status: Every Day    Substances: Nicotine, Flavoring    Devices: Pre-filled or refillable cartridge, Refillable tank    Passive vaping exposure: Yes   Substance and Sexual Activity    Alcohol use: Not Currently    Drug use: Not Currently     Types: Methamphetamines     Comment: sober 5 months     Sexual activity: Defer       Family History:  History reviewed. No pertinent family history.      Subjective      Review of Systems:   Review of Systems   Constitutional:  Negative for chills, fatigue and fever.   Respiratory:  Negative for shortness of breath.    Cardiovascular:  Negative for chest pain.   Gastrointestinal:  Negative for abdominal pain.   Skin:  Negative for skin lesions.   Neurological:  Negative for seizures and confusion.   Psychiatric/Behavioral:  Positive for stress. Negative for hallucinations, sleep disturbance, suicidal ideas and depressed mood. The patient is nervous/anxious.        PHQ-9 Depression Screening  Little interest or pleasure in doing things? (Patient-Rptd) Not at all   Feeling down, depressed, or hopeless?     PHQ-2 Total Score     Trouble falling or staying asleep, or sleeping too much? (Patient-Rptd) Not at all   Feeling tired or having little energy?     Poor appetite or overeating? (Patient-Rptd) Not at all   Feeling bad about yourself - or that you are a failure or have let yourself or your family down? (Patient-Rptd) Not at all   Trouble concentrating on things, such as reading the newspaper or watching television? (Patient-Rptd) Several days   Moving or speaking so slowly that other people could have noticed? Or the opposite - being so fidgety or restless that you have been moving around a lot more than usual? (Patient-Rptd) Several days   Thoughts that you would be better off dead, or of hurting yourself in some way? (Patient-Rptd) Not at all   PHQ-9 Total Score     If you checked off any problems, how difficult have these problems made it for you to do your work, take care of things at home, or get along with other people? (Patient-Rptd) Somewhat difficult         VIRGILIO-7 Score:   Feeling nervous, anxious or on edge: (Patient-Rptd) Several days  Not being able to stop or control worrying: (Patient-Rptd) Several days  Worrying too much about different things:  (Patient-Rptd) Several days  Trouble Relaxing: (Patient-Rptd) Several days  Being so restless that it is hard to sit still: (Patient-Rptd) Several days  Feeling afraid as if something awful might happen: (Patient-Rptd) Not at all  Becoming easily annoyed or irritable: (Patient-Rptd) Several days  VIRGILIO 7 Total Score: (Patient-Rptd) 6  If you checked any problems, how difficult have these problems made it for you to do your work, take care of things at home, or get along with other people: (Patient-Rptd) Not difficult at all    Patient History:   The following portions of the patient's history were reviewed and updated as appropriate: allergies, current medications, past family history, past medical history, past social history, past surgical history and problem list.     Social:  Social History     Socioeconomic History    Marital status: Single   Tobacco Use    Smoking status: Every Day     Current packs/day: 2.00     Average packs/day: 2.0 packs/day for 21.0 years (42.0 ttl pk-yrs)     Types: Cigarettes     Start date: 2004     Passive exposure: Current    Smokeless tobacco: Never   Vaping Use    Vaping status: Every Day    Substances: Nicotine, Flavoring    Devices: Pre-filled or refillable cartridge, Refillable tank    Passive vaping exposure: Yes   Substance and Sexual Activity    Alcohol use: Not Currently    Drug use: Not Currently     Types: Methamphetamines     Comment: sober 5 months    Sexual activity: Defer       Medications:     Current Outpatient Medications:     buprenorphine-naloxone (SUBOXONE) 8-2 MG per SL tablet, Place 2 tablets under the tongue Daily., Disp: 52 tablet, Rfl: 0    traZODone (DESYREL) 100 MG tablet, Take 1 tablet by mouth At Night As Needed for Sleep., Disp: 30 tablet, Rfl: 1    Sofosbuvir-Velpatasvir (Epclusa) 400-100 MG tablet, Take 1 tablet by mouth Daily. (Patient not taking: Reported on 12/31/2024), Disp: , Rfl:     Objective     Physical Exam:  Physical Exam  Vitals reviewed.    Constitutional:       General: He is not in acute distress.     Appearance: He is well-developed. He is not ill-appearing.   Pulmonary:      Effort: No respiratory distress.   Neurological:      Mental Status: He is alert and oriented to person, place, and time.      Gait: Gait normal.   Psychiatric:         Attention and Perception: Attention normal.         Mood and Affect: Mood and affect normal.         Speech: Speech normal.         Behavior: Behavior normal. Behavior is cooperative.         Thought Content: Thought content is not paranoid or delusional. Thought content does not include homicidal or suicidal ideation. Thought content does not include homicidal or suicidal plan.         Cognition and Memory: Cognition and memory normal.         Vital Signs: There were no vitals filed for this visit.  There is no height or weight on file to calculate BMI.     Mental Status Exam: Reviewed 12/31/2024  Hygiene:   good  Cooperation:  Cooperative  Eye Contact:  Good  Psychomotor Behavior:  Appropriate  Affect:  Full range  Mood: normal  Speech:  Normal  Thought Process:  Goal directed  Thought Content:  Normal  Suicidal:  None  Homicidal:  None  Hallucinations:  None  Delusion:  None  Memory:  Intact  Orientation:  Person, Place, Time, and Situation  Reliability:  good  Insight:  Fair  Judgement:  Fair  Impulse Control:  Fair    Assessment / Plan      -Continue buprenorphine/naloxone 16-4 mg daily for opioid use disorder  -Will have RTC dose at next visit +0 remaining  -Advisement to take part in and remain active in 12 Step Recovery Meetings, IOP, and/or 1:1 therapy/counseling and to establish/maintain an active relationship with a recovery sponsor.   -Agreeable to continued monitoring and will request confirmations with levels on all preliminary positive results for patient safety, medication compliance, adherence to treatment plan, toxicity monitoring (when applicable), and planning of future care.   -Continue to  follow 15/15/15 minute administration rule  -Safe medication storage encouraged  -Does not need Narcan refill today  -Continue trazodone 100mg nightly as needed for sleep.  Medication working well.  Wakes feeling rested.  -Recent HCV RNA negative.  Discussed with JEEVAN De La O.  Will redraw HCV RNA in 12 weeks to confirm care.    Assessment & Plan   Problems Addressed this Visit    None  Visit Diagnoses       Insomnia, psychophysiological        Relevant Medications    traZODone (DESYREL) 100 MG tablet    Opioid use disorder, severe, on maintenance therapy        Relevant Medications    buprenorphine-naloxone (SUBOXONE) 8-2 MG per SL tablet          Diagnoses         Codes Comments    Insomnia, psychophysiological     ICD-10-CM: F51.04  ICD-9-CM: 307.42     Opioid use disorder, severe, on maintenance therapy     ICD-10-CM: F11.20  ICD-9-CM: 304.00             Visit Diagnoses:    ICD-10-CM ICD-9-CM   1. Insomnia, psychophysiological  F51.04 307.42   2. Opioid use disorder, severe, on maintenance therapy  F11.20 304.00             PLAN:  Safety: No acute safety concerns  Risk Assessment: Risk of self-harm acutely is low. Risk of self-harm chronically is also low, but could be further elevated in the event of treatment noncompliance and/or AODA.      TREATMENT PLAN: Continue supportive psychotherapy efforts and medications as indicated. Treatment and medication options discussed during today's visit. Patient acknowledged and verbally consented to continue with current treatment plan and was educated on the importance of compliance with treatment and follow-up appointments.    GOALS:  Short Term Goals: Patient will be compliant with medication, and patient will have no significant medication related side effects.  Patient will be engaged in psychotherapy as indicated.  Patient will report subjective improvement of symptoms.  Long term goals: To stabilize mood and treat/improve subjective symptoms, the patient will  stay out of the hospital, the patient will be at an optimal level of functioning, and the patient will take all medications as prescribed.  The patient/guardian verbalized understanding and agreement with goals that were mutually set.      MEDICATION ISSUES:  CHARISSE reviewed as expected.  Discussed medication options and treatment plan of prescribed medication as well as the risks, benefits, and side effects including potential falls, possible impaired driving and metabolic adversities among others. Patient is agreeable to call the office with any worsening of symptoms or onset of side effects. Patient is agreeable to call 911 or go to the nearest ER should he/she begin having SI/HI. No medication side effects or related complaints today.     MEDS ORDERED DURING VISIT:  New Medications Ordered This Visit   Medications    traZODone (DESYREL) 100 MG tablet     Sig: Take 1 tablet by mouth At Night As Needed for Sleep.     Dispense:  30 tablet     Refill:  1    buprenorphine-naloxone (SUBOXONE) 8-2 MG per SL tablet     Sig: Place 2 tablets under the tongue Daily.     Dispense:  52 tablet     Refill:  0     NADEAN:[id number]       Return in about 4 weeks (around 1/28/2025) for Follow Up Medication, Telehealth Visit.           This document has been electronically signed by KRYSTYNA Strong  December 31, 2024 11:28 EST     Mode of Visit: Video  Location of patient: -OTHER-: Classroom  Location of provider: +HOME+  You have chosen to receive care through a telehealth visit.  The patient has signed the video visit consent form.  The visit included audio and video interaction. No technical issues occurred during this visit.      Part of this note may be an electronic transcription/translation of spoken language to printed text using the Dragon Dictation System.

## 2025-01-27 DIAGNOSIS — F11.20 OPIOID USE DISORDER, SEVERE, ON MAINTENANCE THERAPY: ICD-10-CM

## 2025-01-27 DIAGNOSIS — F51.04 INSOMNIA, PSYCHOPHYSIOLOGICAL: ICD-10-CM

## 2025-01-27 RX ORDER — BUPRENORPHINE HYDROCHLORIDE AND NALOXONE HYDROCHLORIDE DIHYDRATE 8; 2 MG/1; MG/1
2 TABLET SUBLINGUAL DAILY
Qty: 4 TABLET | Refills: 0 | Status: SHIPPED | OUTPATIENT
Start: 2025-01-27 | End: 2025-01-28 | Stop reason: SDUPTHER

## 2025-01-27 RX ORDER — TRAZODONE HYDROCHLORIDE 100 MG/1
100 TABLET ORAL NIGHTLY PRN
Qty: 30 TABLET | Refills: 1 | Status: SHIPPED | OUTPATIENT
Start: 2025-01-27

## 2025-01-28 ENCOUNTER — TELEMEDICINE (OUTPATIENT)
Dept: PSYCHIATRY | Facility: CLINIC | Age: 50
End: 2025-01-28
Payer: MEDICAID

## 2025-01-28 ENCOUNTER — LAB (OUTPATIENT)
Dept: LAB | Facility: HOSPITAL | Age: 50
End: 2025-01-28
Payer: MEDICAID

## 2025-01-28 DIAGNOSIS — F33.1 MODERATE EPISODE OF RECURRENT MAJOR DEPRESSIVE DISORDER: ICD-10-CM

## 2025-01-28 DIAGNOSIS — F43.10 POST TRAUMATIC STRESS DISORDER (PTSD): ICD-10-CM

## 2025-01-28 DIAGNOSIS — F11.20 OPIOID USE DISORDER, SEVERE, ON MAINTENANCE THERAPY: ICD-10-CM

## 2025-01-28 DIAGNOSIS — F51.04 INSOMNIA, PSYCHOPHYSIOLOGICAL: ICD-10-CM

## 2025-01-28 DIAGNOSIS — F15.21 METHAMPHETAMINE USE DISORDER, SEVERE, IN EARLY REMISSION: ICD-10-CM

## 2025-01-28 DIAGNOSIS — F41.1 GENERALIZED ANXIETY DISORDER: ICD-10-CM

## 2025-01-28 DIAGNOSIS — F11.20 OPIOID USE DISORDER, SEVERE, ON MAINTENANCE THERAPY: Primary | ICD-10-CM

## 2025-01-28 PROCEDURE — 80306 DRUG TEST PRSMV INSTRMNT: CPT

## 2025-01-28 RX ORDER — BUPRENORPHINE HYDROCHLORIDE AND NALOXONE HYDROCHLORIDE DIHYDRATE 8; 2 MG/1; MG/1
2 TABLET SUBLINGUAL DAILY
Qty: 56 TABLET | Refills: 0 | Status: SHIPPED | OUTPATIENT
Start: 2025-01-28

## 2025-01-28 NOTE — PROGRESS NOTES
Telehealth Visit      This provider is located at [facility], [address] in [place], [place],using a secure ethorityhart Video Visit through [employer]. Patient is being seen remotely via telehealth at their home address in [place], and stated they are in a secure environment for this session. The patient's condition being diagnosed/treated is appropriate for telemedicine. The provider identified herself as well as her credentials. The patient, and/or patients guardian, consent to be seen remotely, and when consent is given they understand that the consent allows for patient identifiable information to be sent to a third party as needed. They may refuse to be seen remotely at any time. The electronic data is encrypted and password protected, and the patient and/or guardian has been advised of the potential risks to privacy not withstanding such measures.    Patient Name: Simon Pate  : 1975   MRN: [id number]     Referring Provider: Provider, No Known    Chief Complaint: Follow up on substance use    History of Present Illness:   Simon Pate is a 49 y.o. male who is here today for follow up related to MOUD.  He is continuing on buprenorphine/naloxone 16-4mg daily and is tolerating well with no adverse effects. Continues to do well in his sobriety and is still in care at sober living. Sober since 2024. Denies any recurrence of illicit substance or alcohol use, cravings, or triggering events since last follow up. Attending recovery meetings or participating in recovery related content 5 a week.  Has been volunteering regularly at the homeless shelter ([facility]) and has found it to be very beneficial for his recovery and mental health.  Feels good to get back.  LFTs are current as of 2024. Trazodone 100mg nightly continues to be very helpful for sleep.  Some times takes 50 mg when he has been very busy throughout the day. Sleeping  well and wakes feeling rested.  Denies any adverse effects.  Recent HCV RNA negative. Did not complete last month of Epclusa.  Will redraw HCVRNA 3/2025.  Denies any SI/HI, AVH.  No other complaints today    Triggers: Anxiety    Cravings: Denies    Relapse Prevention: MOUD, sober living programming    Urine Drug Screen (today's visit) discussed: Has transportation scheduled for later today to come to lab    UDS Confirmation: 12/19/2024 positive buprenorphine, norbuprenorphine    CHARISSE (PDMP) Reviewed for Current/Active Medications: Buprenorphine/naloxone bridge dose not reflected.    Past Surgical History:  Past Surgical History:   Procedure Laterality Date    CYST REMOVAL      MULTIPLE TOOTH EXTRACTIONS         Problem List:  There is no problem list on file for this patient.      Allergy:   Allergies   Allergen Reactions    Ibuprofen Hives and Itching        Current Medications:   Current Outpatient Medications   Medication Sig Dispense Refill    buprenorphine-naloxone (SUBOXONE) 8-2 MG per SL tablet Place 2 tablets under the tongue Daily. 56 tablet 0    traZODone (DESYREL) 100 MG tablet Take 1 tablet by mouth At Night As Needed for Sleep. 30 tablet 1     No current facility-administered medications for this visit.       Past Medical History:  Past Medical History:   Diagnosis Date    Substance abuse        Social History:  Social History     Socioeconomic History    Marital status: Single   Tobacco Use    Smoking status: Every Day     Current packs/day: 2.00     Average packs/day: 2.0 packs/day for 21.1 years (42.1 ttl pk-yrs)     Types: Cigarettes     Start date: 2004     Passive exposure: Current    Smokeless tobacco: Never   Vaping Use    Vaping status: Every Day    Substances: Nicotine, Flavoring    Devices: Pre-filled or refillable cartridge, Refillable tank    Passive vaping exposure: Yes   Substance and Sexual Activity    Alcohol use: Not Currently    Drug use: Not Currently     Types: Methamphetamines      Comment: sober 5 months    Sexual activity: Defer       Family History:  History reviewed. No pertinent family history.      Subjective      Review of Systems:   Review of Systems   Constitutional:  Negative for chills, fatigue and fever.   Respiratory:  Negative for shortness of breath.    Cardiovascular:  Negative for chest pain.   Gastrointestinal:  Negative for abdominal pain.   Skin:  Negative for skin lesions.   Neurological:  Negative for seizures and confusion.   Psychiatric/Behavioral:  Positive for stress. Negative for hallucinations, sleep disturbance, suicidal ideas and depressed mood. The patient is nervous/anxious.        PHQ-9 Depression Screening  Little interest or pleasure in doing things? (Patient-Rptd) Several days   Feeling down, depressed, or hopeless? (Patient-Rptd) Several days   PHQ-2 Total Score (Patient-Rptd) 2   Trouble falling or staying asleep, or sleeping too much? (Patient-Rptd) Several days   Feeling tired or having little energy? (Patient-Rptd) Several days   Poor appetite or overeating? (Patient-Rptd) Not at all   Feeling bad about yourself - or that you are a failure or have let yourself or your family down? (Patient-Rptd) Not at all   Trouble concentrating on things, such as reading the newspaper or watching television? (Patient-Rptd) Several days   Moving or speaking so slowly that other people could have noticed? Or the opposite - being so fidgety or restless that you have been moving around a lot more than usual? (Patient-Rptd) Several days   Thoughts that you would be better off dead, or of hurting yourself in some way? (Patient-Rptd) Not at all   PHQ-9 Total Score (Patient-Rptd) 6   If you checked off any problems, how difficult have these problems made it for you to do your work, take care of things at home, or get along with other people? (Patient-Rptd) Not difficult at all       VIRGILIO-7 Score:   Feeling nervous, anxious or on edge: (Patient-Rptd) Several days  Not being  able to stop or control worrying: (Patient-Rptd) Not at all  Worrying too much about different things: (Patient-Rptd) Not at all  Trouble Relaxing: (Patient-Rptd) Several days  Being so restless that it is hard to sit still: (Patient-Rptd) Several days  Feeling afraid as if something awful might happen: (Patient-Rptd) Not at all  Becoming easily annoyed or irritable: (Patient-Rptd) Several days  VIRGILIO 7 Total Score: (Patient-Rptd) 4  If you checked any problems, how difficult have these problems made it for you to do your work, take care of things at home, or get along with other people: (Patient-Rptd) Somewhat difficult    Patient History:   The following portions of the patient's history were reviewed and updated as appropriate: allergies, current medications, past family history, past medical history, past social history, past surgical history and problem list.     Social:  Social History     Socioeconomic History    Marital status: Single   Tobacco Use    Smoking status: Every Day     Current packs/day: 2.00     Average packs/day: 2.0 packs/day for 21.1 years (42.1 ttl pk-yrs)     Types: Cigarettes     Start date: 2004     Passive exposure: Current    Smokeless tobacco: Never   Vaping Use    Vaping status: Every Day    Substances: Nicotine, Flavoring    Devices: Pre-filled or refillable cartridge, Refillable tank    Passive vaping exposure: Yes   Substance and Sexual Activity    Alcohol use: Not Currently    Drug use: Not Currently     Types: Methamphetamines     Comment: sober 5 months    Sexual activity: Defer       Medications:     Current Outpatient Medications:     buprenorphine-naloxone (SUBOXONE) 8-2 MG per SL tablet, Place 2 tablets under the tongue Daily., Disp: 56 tablet, Rfl: 0    traZODone (DESYREL) 100 MG tablet, Take 1 tablet by mouth At Night As Needed for Sleep., Disp: 30 tablet, Rfl: 1    Objective     Physical Exam:  Physical Exam  Vitals reviewed.   Constitutional:       General: He is not in  acute distress.     Appearance: He is well-developed. He is not ill-appearing.   Pulmonary:      Effort: No respiratory distress.   Neurological:      Mental Status: He is alert and oriented to person, place, and time.      Gait: Gait normal.   Psychiatric:         Attention and Perception: Attention normal.         Mood and Affect: Mood and affect normal.         Speech: Speech normal.         Behavior: Behavior normal. Behavior is cooperative.         Thought Content: Thought content is not paranoid or delusional. Thought content does not include homicidal or suicidal ideation. Thought content does not include homicidal or suicidal plan.         Cognition and Memory: Cognition and memory normal.         Vital Signs: There were no vitals filed for this visit.  There is no height or weight on file to calculate BMI.     Mental Status Exam: Reviewed 1/28/2025  Hygiene:   good  Cooperation:  Cooperative  Eye Contact:  Good  Psychomotor Behavior:  Appropriate  Affect:  Full range  Mood: normal  Speech:  Normal  Thought Process:  Goal directed  Thought Content:  Normal  Suicidal:  None  Homicidal:  None  Hallucinations:  None  Delusion:  None  Memory:  Intact  Orientation:  Person, Place, Time, and Situation  Reliability:  good  Insight:  Fair  Judgement:  Fair  Impulse Control:  Fair    Assessment / Plan      -Continue buprenorphine/naloxone 16-4 mg daily for opioid use disorder.  Declines taper/Sublocade.  -Will have RTC dose at next visit +0 remaining  -Advisement to take part in and remain active in 12 Step Recovery Meetings, IOP, and/or 1:1 therapy/counseling and to establish/maintain an active relationship with a recovery sponsor.   -Agreeable to continued monitoring and will request confirmations with levels on all preliminary positive results for patient safety, medication compliance, adherence to treatment plan, toxicity monitoring (when applicable), and planning of future care.  Coming in today for  UDS.  -Continue to follow 15/15/15 minute administration rule  -Safe medication storage encouraged  -Does not need Narcan refill today  -Continue trazodone 100mg nightly as needed for sleep.  Medication working well.  Wakes feeling rested.  Will try to cut back to 50 to 100 mg nightly and see how it does.  Refill sent yesterday.  -Recent HCV RNA negative.  Discussed with JEEVAN De La O.  Will redraw HCV RNA in 12 weeks to confirm care.    Assessment & Plan   Problems Addressed this Visit    None  Visit Diagnoses       Opioid use disorder, severe, on maintenance therapy    -  Primary    Relevant Medications    buprenorphine-naloxone (SUBOXONE) 8-2 MG per SL tablet    Insomnia, psychophysiological              Diagnoses         Codes Comments    Opioid use disorder, severe, on maintenance therapy    -  Primary ICD-10-CM: F11.20  ICD-9-CM: 304.00     Insomnia, psychophysiological     ICD-10-CM: F51.04  ICD-9-CM: 307.42             Visit Diagnoses:    ICD-10-CM ICD-9-CM   1. Opioid use disorder, severe, on maintenance therapy  F11.20 304.00   2. Insomnia, psychophysiological  F51.04 307.42       PLAN:  Safety: No acute safety concerns  Risk Assessment: Risk of self-harm acutely is low. Risk of self-harm chronically is also low, but could be further elevated in the event of treatment noncompliance and/or AODA.      TREATMENT PLAN: Continue supportive psychotherapy efforts and medications as indicated. Treatment and medication options discussed during today's visit. Patient acknowledged and verbally consented to continue with current treatment plan and was educated on the importance of compliance with treatment and follow-up appointments.    GOALS:  Short Term Goals: Patient will be compliant with medication, and patient will have no significant medication related side effects.  Patient will be engaged in psychotherapy as indicated.  Patient will report subjective improvement of symptoms.  Long term goals: To stabilize  mood and treat/improve subjective symptoms, the patient will stay out of the hospital, the patient will be at an optimal level of functioning, and the patient will take all medications as prescribed.  The patient/guardian verbalized understanding and agreement with goals that were mutually set.      MEDICATION ISSUES:  CHARISSE reviewed as expected.  Discussed medication options and treatment plan of prescribed medication as well as the risks, benefits, and side effects including potential falls, possible impaired driving and metabolic adversities among others. Patient is agreeable to call the office with any worsening of symptoms or onset of side effects. Patient is agreeable to call 911 or go to the nearest ER should he/she begin having SI/HI. No medication side effects or related complaints today.     MEDS ORDERED DURING VISIT:  New Medications Ordered This Visit   Medications    buprenorphine-naloxone (SUBOXONE) 8-2 MG per SL tablet     Sig: Place 2 tablets under the tongue Daily.     Dispense:  56 tablet     Refill:  0     NADEAN:[id number]       Return in about 4 weeks (around 2/25/2025) for Follow Up Medication, Telehealth Visit.           This document has been electronically signed by KRYSTYNA Strong  January 28, 2025 11:52 EST     Mode of Visit: Video  Location of patient: -HOME-  Location of provider[facility]  You have chosen to receive care through a telehealth visit.  The patient has signed the video visit consent form.  The visit included audio and video interaction. No technical issues occurred during this visit.      Part of this note may be an electronic transcription/translation of spoken language to printed text using the Dragon Dictation System.

## 2025-02-03 LAB — REF LAB TEST METHOD: NORMAL
